# Patient Record
Sex: FEMALE | Race: WHITE | NOT HISPANIC OR LATINO | Employment: OTHER | URBAN - METROPOLITAN AREA
[De-identification: names, ages, dates, MRNs, and addresses within clinical notes are randomized per-mention and may not be internally consistent; named-entity substitution may affect disease eponyms.]

---

## 2020-05-19 ENCOUNTER — CONVERSION ENCOUNTER (OUTPATIENT)
Dept: OTHER | Facility: HOSPITAL | Age: 85
End: 2020-05-19

## 2020-05-19 ENCOUNTER — OFFICE VISIT CONVERTED (OUTPATIENT)
Dept: CARDIOLOGY | Facility: CLINIC | Age: 85
End: 2020-05-19
Attending: INTERNAL MEDICINE

## 2020-09-15 ENCOUNTER — OFFICE VISIT CONVERTED (OUTPATIENT)
Dept: CARDIOLOGY | Facility: CLINIC | Age: 85
End: 2020-09-15
Attending: INTERNAL MEDICINE

## 2020-09-15 ENCOUNTER — CONVERSION ENCOUNTER (OUTPATIENT)
Dept: OTHER | Facility: HOSPITAL | Age: 85
End: 2020-09-15

## 2020-10-29 ENCOUNTER — HOSPITAL ENCOUNTER (OUTPATIENT)
Dept: INFUSION THERAPY | Facility: HOSPITAL | Age: 85
Setting detail: HOSPITAL OUTPATIENT SURGERY
Discharge: HOME | End: 2020-10-29
Attending: INTERNAL MEDICINE

## 2020-10-29 LAB
ANION GAP SERPL CALC-SCNC: 16 MMOL/L (ref 8–19)
APTT BLD: 21.7 S (ref 22.2–34.2)
BASOPHILS # BLD AUTO: 0.05 10*3/UL (ref 0–0.2)
BASOPHILS NFR BLD AUTO: 0.9 % (ref 0–3)
BUN SERPL-MCNC: 33 MG/DL (ref 5–25)
BUN/CREAT SERPL: 26 {RATIO} (ref 6–20)
CALCIUM SERPL-MCNC: 10.6 MG/DL (ref 8.7–10.4)
CHLORIDE SERPL-SCNC: 102 MMOL/L (ref 99–111)
CONV ABS IMM GRAN: 0.02 10*3/UL (ref 0–0.2)
CONV CO2: 28 MMOL/L (ref 22–32)
CONV IMMATURE GRAN: 0.4 % (ref 0–1.8)
CREAT UR-MCNC: 1.26 MG/DL (ref 0.5–0.9)
DEPRECATED RDW RBC AUTO: 49.6 FL (ref 36.4–46.3)
EOSINOPHIL # BLD AUTO: 0.12 10*3/UL (ref 0–0.7)
EOSINOPHIL # BLD AUTO: 2.1 % (ref 0–7)
ERYTHROCYTE [DISTWIDTH] IN BLOOD BY AUTOMATED COUNT: 14 % (ref 11.7–14.4)
GFR SERPLBLD BASED ON 1.73 SQ M-ARVRAT: 39 ML/MIN/{1.73_M2}
GLUCOSE SERPL-MCNC: 109 MG/DL (ref 65–99)
HCT VFR BLD AUTO: 39.7 % (ref 37–47)
HGB BLD-MCNC: 12.7 G/DL (ref 12–16)
INR PPP: 0.97 (ref 2–3)
LYMPHOCYTES # BLD AUTO: 1.38 10*3/UL (ref 1–5)
LYMPHOCYTES NFR BLD AUTO: 24.2 % (ref 20–45)
MCH RBC QN AUTO: 30.8 PG (ref 27–31)
MCHC RBC AUTO-ENTMCNC: 32 G/DL (ref 33–37)
MCV RBC AUTO: 96.4 FL (ref 81–99)
MONOCYTES # BLD AUTO: 0.42 10*3/UL (ref 0.2–1.2)
MONOCYTES NFR BLD AUTO: 7.4 % (ref 3–10)
NEUTROPHILS # BLD AUTO: 3.72 10*3/UL (ref 2–8)
NEUTROPHILS NFR BLD AUTO: 65 % (ref 30–85)
NRBC CBCN: 0 % (ref 0–0.7)
OSMOLALITY SERPL CALC.SUM OF ELEC: 302 MOSM/KG (ref 273–304)
PLATELET # BLD AUTO: 143 10*3/UL (ref 130–400)
PMV BLD AUTO: 9.6 FL (ref 9.4–12.3)
POTASSIUM SERPL-SCNC: 4.1 MMOL/L (ref 3.5–5.3)
PROTHROMBIN TIME: 10.5 S (ref 9.4–12)
RBC # BLD AUTO: 4.12 10*6/UL (ref 4.2–5.4)
SODIUM SERPL-SCNC: 142 MMOL/L (ref 135–147)
WBC # BLD AUTO: 5.71 10*3/UL (ref 4.8–10.8)

## 2020-11-10 ENCOUNTER — CONVERSION ENCOUNTER (OUTPATIENT)
Dept: OTHER | Facility: HOSPITAL | Age: 85
End: 2020-11-10

## 2020-11-10 ENCOUNTER — OFFICE VISIT CONVERTED (OUTPATIENT)
Dept: CARDIOLOGY | Facility: CLINIC | Age: 85
End: 2020-11-10
Attending: INTERNAL MEDICINE

## 2021-03-16 ENCOUNTER — OFFICE VISIT CONVERTED (OUTPATIENT)
Dept: CARDIOLOGY | Facility: CLINIC | Age: 86
End: 2021-03-16
Attending: INTERNAL MEDICINE

## 2021-03-16 ENCOUNTER — CONVERSION ENCOUNTER (OUTPATIENT)
Dept: OTHER | Facility: HOSPITAL | Age: 86
End: 2021-03-16

## 2021-05-10 NOTE — H&P
History and Physical      Patient Name: Renetta Cuello   Patient ID: [id number]   Sex: Female   YOB: 1935    Primary Care Provider: Joanne Mcdermott   Referring Provider: Joanne Mcdermott    Visit Date: May 19, 2020    Provider: Abel Best MD   Location: [facility]   Location Address: [address] [place] [address]  [id number]          History Of Present Illness  Consult requested by: Joanne Mcdermott   Dear Joanne, I saw Renetta Cuello in the office today. As you know, she is an 85-year-old, white female. She has a history of coronary artery disease, which has been medically managed for several years, chronic atrial fibrillation and a dual-chamber permanent pacemaker. She has a history of chronic lymphedema and diastolic heart failure. Recently she has had some increased weakness and feelings of some subjective tachycardia. Her thyroid medication dose was reduced recently. Otherwise she denies chest pain. She is frail and has difficulty with mobility but denies excessive shortness of breath. She has chronic lower-extremity edema, which has been stable.   PAST MEDICAL HISTORY: includes atrial fibrillation, sinus node dysfunction, permanent pacemaker, diastolic heart failure, chronic lower-extremity edema, dyslipidemia, hypothyroidism, carotid artery disease. Previous cardiac catheterization has shown somewhat diffuse coronary artery disease with 100% chronic total occlusion of the LAD, dominant right coronary which is diffusely diseased, and this has been managed medically over time.   PSYCHOSOCIAL HISTORY: No smoking. No alcohol use. Daily caffeine. She is .   FAMILY HISTORY: Positive for coronary artery disease and hypertension.   CURRENT MEDICATIONS: include Lasix 40 mg twice a day; Eliquis 2.5 mg b.i.d.; Levothyroxine 125 mcg a day; Carvedilol 25 mg twice a day; Hydrocodone as needed; Ramipril 10 mg a day; Potassium 20 mEq twice a day; Pravastatin 40 mg a day;  "Gabapentin 300 mg a day; Vitamin D; Vitamin B12. The dosage and frequency of the medications were reviewed with the patient.   ALLERGIES: No medical allergies.      PAST SURGICAL HISTORY:  She has had a hysterectomy, tonsillectomy, hip replacement, pacemaker implant which is a Robodrom device.       Review of Systems  · Constitutional  o Admits  o : fatigue, good general health lately  o Denies  o : recent weight changes   · Eyes  o Admits  o : blurred vision  o Denies  o : double vision  · HENT  o Admits  o : hearing loss  o Denies  o : ringing, chronic sinus problem, swollen glands in neck  · Cardiovascular  o Admits  o : palpitations, lower-extremity swelling and shortness of breath.  o Denies  o : chest pain  · Respiratory  o Denies  o : chronic or frequent cough, asthma or wheezing, COPD  · Gastrointestinal  o Admits  o : nausea  o Denies  o : ulcers, vomiting  · Neurologic  o Admits  o : occasional dizziness  o Denies  o : lightheaded, stroke, headaches  · Musculoskeletal  o Denies  o : joint pain, back pain  · Endocrine  o Admits  o : thyroid disease, cold intolerance, excessive thirst.  o Denies  o : diabetes, heat intolerance, excessive urination  · Heme-Lymph  o Admits  o : easy bruising  o Denies  o : bleeding tendency, anemia      Vitals  Date Time BP Position Site L\R Cuff Size HR RR TEMP (F) WT  HT  BMI kg/m2 BSA m2 O2 Sat HC       05/19/2020 02:49 /93 Sitting    78 - R   157lbs 16oz 5'  6\" 25.5 1.83     05/19/2020 02:49 /79 Sitting                      Temperature:  98.6.       Physical Examination  · Constitutional  o Appearance  o : This is an elderly, white female, pleasant, in no acute distress.  · Head and Face  o HEENT  o : No pallor, anicteric. Eyes normal. Moist mucous membranes.  · Neck  o Inspection/Palpation  o : Supple. No hepatosplenomegaly.  o Jugular Veins  o : No JVD. No carotid bruits.  · Respiratory  o Auscultation of Lungs  o : Clear to auscultation bilaterally. " No crackles or wheezing.  · Cardiovascular  o Heart  o : Irregular S1 and S2 with a soft basal systolic murmur.  · Gastrointestinal  o Abdominal Examination  o : Soft, non-distended. No palpable hepatosplenomegaly. Bowel sounds heard in all four quadrants.  · Musculoskeletal  o General  o : Normal muscle tone and strength.  · Skin and Subcutaneous Tissue  o General Inspection  o : No skin rashes.  · Extremities  o Extremities  o : Warm and well perfused. Distal pulses present. 2+ chronic edema.     I personally reviewed her EKG done today for atrial fibrillation that shows atrial fibrillation with demand ventricular pacing, rate 88, left ventricular hypertrophy with non-specific ST changes.    I reviewed her previous cardiac records.  I reviewed her primary care records as well.    Most recent creatinine 1.09.               Assessment     1.  History of sinus node dysfunction and AV block with dual-chamber permanent pacemaker - The patient has       not had a recent pacemaker interrogation over the past year for unclear reasons.  She apparently is still on       remote monitoring.  Her device on EKG appears to be functioning normally.    2.  Chronic atrial fibrillation - rate stable currently.  She is perhaps feeling more palpitations recently due to her       thyroid function, and this is being addressed.  Continue dose-adjusted anti-coagulation for stroke        prevention.  3.  Diastolic heart failure - stable.  4.  Hypertension - stable.       Plan     We will get her remote monitoring transferred to our practice, and we will schedule an in-office pacemaker   interrogation in the next couple of months.  She is overdue for this.  The patient is agreeable with this plan.    It is a privilege to assist in her care.  Please let me know if you have any questions regarding her case.    Sincerely,        HERB Best M.D.  raymond/milagros           This note was transcribed by Lona Johnson.  [email or web address]/cbd  The above service was  transcribed by Lona Johnson, and I attest to the accuracy of the note.  CBD.             Electronically Signed by: Lona Johnson-, -Author on May 27, 2020 06:20:33 AM  Electronically Co-signed by: Abel Best MD -Reviewer on May 27, 2020 08:41:57 AM

## 2021-05-13 NOTE — PROGRESS NOTES
Progress Note      Patient Name: Renetta Bailey   Patient ID: [id number]   Sex: Female   YOB: 1935    Primary Care Provider: Joanne Mcdermott   Referring Provider: Joanne Mcdermott    Visit Date: September 15, 2020    Provider: Abel Best MD   [facility]   Location Address: [address] [place] [address]  [phone]          History Of Present Illness  REFERRING CARE PROVIDER: Joanne Mcdermott   Dear PO Rubio saw Renetta Bailey in the office today. This is for a follow-up evaluation and management of chronic atrial fibrillation, AV block with permanent pacemaker, diastolic heart failure, chronic lower-extremity edema, coronary artery disease with 100%  of the LAD and hypertension. Renetta is doing relatively well. She has chronic shortness of breath with exertion, chronic lower-extremity edema; these have been stable. She denies bleeding on chronic anti-coagulation. No palpitations or chest pain.   PAST MEDICAL HISTORY: includes atrial fibrillation, sinus node dysfunction, permanent pacemaker, diastolic heart failure, chronic lower-extremity edema, dyslipidemia, hypothyroidism, carotid artery disease. Previous cardiac catheterization has shown somewhat diffuse coronary artery disease with 100% chronic total occlusion of the LAD, dominant right coronary which is diffusely diseased, and this has been managed medically over time.   PSYCHOSOCIAL HISTORY: No history of mood changes or depression. She never used alcohol or tobacco.   CURRENT MEDICATIONS: include Lasix 40 mg twice a day; Eliquis 2.5 mg twice a day; Levothyroxine 125 mcg daily; Coreg 25 mg twice a day; Hydrocodone as needed; Ramipril 10 mg daily; Potassium 20 mEq daily; Pravastatin 40 mg daily; Gabapentin 300 mg daily; Vitamin D; Vitamin B. The dosage and frequency of the medications were reviewed with the patient.       Review of Systems  · Cardiovascular  o Admits  o : swelling  "(feet, ankles, hands), shortness of breath while walking or lying flat  o Denies  o : palpitations (fast, fluttering, or skipping beats), chest pain or angina pectoris   · Respiratory  o Denies  o : chronic or frequent cough, asthma or wheezing      Vitals  Date Time BP Position Site L\R Cuff Size HR RR TEMP (F) WT  HT  BMI kg/m2 BSA m2 O2 Sat        09/15/2020 10:20 /76 Sitting    73 - R   160lbs 0oz 5'  6\" 25.82 1.84           Physical Examination  · Respiratory  o Auscultation of Lungs  o : Clear to auscultation bilaterally. No crackles or rhonchi.  · Cardiovascular  o Heart  o : S1, S2 is normally heard. No S3. No rubs or gallops. She has a soft basal systolic murmur.   · Gastrointestinal  o Abdominal Examination  o : Soft, nontender, nondistended. No free fluid. Bowel sounds heard in all four quadrants.  · Extremities  o Extremities  o : Warm and well perfused. 2+ chronic lower-extremity edema. Distal pulses present.     Her pacemaker was interrogated today.  I reviewed the intracardiac tracings.  The ventricular lead function is normal.  She is programmed VVIR.  She is 59% ventricular paced, 100% atrial fibrillation, one non-sustained ventricular episode.  Battery life, 1 to 2 months before RICO.           Assessment     1.  History of sinus node dysfunction/AV block with dual-chamber permanent pacemaker - The device is       functioning normally.  It is very close to elective replacement voltage.  2.  Chronic atrial fibrillation - Rate controlled, on chronic anti-coagulation.  3.  Diastolic heart failure - Stable.  4.  Coronary artery disease with  of the LAD - Clinically stable.  5.  Hypertension - Stable.       Plan     1.  Continue current medical therapy.  2.  Remote pacemaker monitoring, will discuss scheduling and generator replacement once she reaches        elective replacement voltage.  3.  Clinical follow-up will be arranged.    It is a pleasure to assist in her care.   Please let me know " if you have any questions regarding her case.    Sincerely,         HERB andrade/milagros           This note was transcribed by Lona Johnson.  dmd/cbd  The above service was transcribed by Lona Johnson, and I attest to the accuracy of the note.  CBD.             Electronically Signed by: Lona Johnson-, -Author on September 17, 2020 09:31:17 AM  Electronically Co-signed by: Abel Best MD -Reviewer on September 17, 2020 11:33:08 AM

## 2021-05-13 NOTE — PROGRESS NOTES
Progress Note      Patient Name: Renetta Bailey   Patient ID: [id number]   Sex: Female   YOB: 1935    Primary Care Provider: Joanne Mcdermott   Referring Provider: Joanne Mcdermott    Visit Date: November 10, 2020    Provider: Abel Best MD   [facility]   Location Address: [address] [place] [address]  [phone]          History Of Present Illness  REFERRING CARE PROVIDER: Joanne Mcdermott   I saw Renetta Bailey back in the office today for a follow-up of recent pacemaker generator replacement, chronic atrial fibrillation, AV block, diastolic heart failure. Renetta is doing relatively well. She underwent pacemaker generator replacement October 29th. The site is healing well. She denies fevers, chills or excessive pain at the site.   PAST MEDICAL HISTORY: includes atrial fibrillation, sinus node dysfunction, permanent pacemaker, diastolic heart failure, chronic lower-extremity edema, dyslipidemia, hypothyroidism, carotid artery disease. Previous cardiac catheterization has shown somewhat diffuse coronary artery disease with 100% chronic total occlusion of the LAD, dominant right coronary which is diffusely diseased, and this has been managed medically over time.   PSYCHOSOCIAL HISTORY: She never used alcohol.   CURRENT MEDICATIONS: include Lasix 40 mg b.i.d.; Eliquis 2.5 mg b.i.d; Levothyroxine 125 mcg daily; Coreg 25 mg b.i.d.; Hydrocodone p.r.n.; Ramipril 10 mg daily; Potassium 20 mEq daily; Pravastatin 40 mg daily; Gabapentin 300 mg daily; Vitamin D; Vitamin B. The dosage and frequency of the medications were reviewed with the patient.       Review of Systems  · Cardiovascular  o Admits  o : swelling (feet, ankles, hands), shortness of breath while walking or lying flat  o Denies  o : palpitations (fast, fluttering, or skipping beats), chest pain or angina pectoris   · Respiratory  o Denies  o : chronic or frequent cough, asthma or  "wheezing      Vitals  Date Time BP Position Site L\R Cuff Size HR RR TEMP (F) WT  HT  BMI kg/m2 BSA m2 O2 Sat FR L/min FiO2 HC       11/10/2020 10:51 /43 Sitting    77 - R   164lbs 0oz 5'  6\" 26.47 1.86             Physical Examination  · Respiratory  o Auscultation of Lungs  o : Clear to auscultation bilaterally. No crackles or rhonchi.  · Cardiovascular  o Heart  o : S1, S2 is normally heard. No S3. No murmur, rubs, or gallops. Her pacemaker site is clean, dry and intact. Skin edge is healing well. No hematoma or fluctuance.  · Gastrointestinal  o Abdominal Examination  o : Soft, nontender, nondistended. No free fluid. Bowel sounds heard in all four quadrants.  · Extremities  o Extremities  o : Warm and well perfused. 2+ lower-extremity edema. Distal pulses present.          Assessment     1.  AV block with dual-chamber permanent pacemaker, s/p generator replacement on October 29th.  Her site      is healing well with no complications.  2.  Chronic atrial fibrillation.  3.  Diastolic heart failure - Stable.       Plan     Follow up in 3 months with pacemaker interrogation and office visit.  Continue remote monitoring in the meantime.  Continue current medical therapy.    HERB andrade/milagros           This note was transcribed by Lona Johnson.  [email or web address]/cbd  The above service was transcribed by Lona Johnson, and I attest to the accuracy of the note.  CBD.             Electronically Signed by: Lona Johnson-, -Author on November 13, 2020 04:17:43 AM  Electronically Co-signed by: Abel Best MD -Reviewer on November 13, 2020 09:05:38 AM  "

## 2021-05-14 VITALS
HEART RATE: 73 BPM | SYSTOLIC BLOOD PRESSURE: 124 MMHG | BODY MASS INDEX: 25.71 KG/M2 | WEIGHT: 160 LBS | HEIGHT: 66 IN | DIASTOLIC BLOOD PRESSURE: 76 MMHG

## 2021-05-14 VITALS
SYSTOLIC BLOOD PRESSURE: 130 MMHG | HEART RATE: 77 BPM | DIASTOLIC BLOOD PRESSURE: 43 MMHG | BODY MASS INDEX: 26.36 KG/M2 | HEIGHT: 66 IN | WEIGHT: 164 LBS

## 2021-05-14 VITALS
HEART RATE: 71 BPM | BODY MASS INDEX: 26.68 KG/M2 | DIASTOLIC BLOOD PRESSURE: 58 MMHG | WEIGHT: 166 LBS | HEIGHT: 66 IN | SYSTOLIC BLOOD PRESSURE: 130 MMHG

## 2021-05-14 NOTE — PROGRESS NOTES
Progress Note      Patient Name: Renetta Bailey   Patient ID: [id number]   Sex: Female   YOB: 1935    Primary Care Provider: Joanne Mcdermott   Referring Provider: Joanne Mcdermott    Visit Date: March 16, 2021    Provider: Abel Best MD   Location: [facility]   Location Address: [address] [place] [address]  [phone]          History Of Present Illness  REFERRING CARE PROVIDER: Joanne Mcdermott   Renetta Bailey is an 86 year old /White female who presents for followup evaluation and management of chronic atrial fibrillation, AV block, diastolic heart failure, and permanent pacemaker. Since the last visit, Renetta is doing relatively well. She has chronic dyspnea and chronic lower extremity edema, which is stable. She is compliant with her medications. No bleeding problems on anticoagulation.   PAST MEDICAL HISTORY: Atrial fibrillation; Sinus node dysfunction; Permanent pacemaker; Diastolic heart failure; Chronic lower extremity edema; Dyslipidemia, Hypothyroidism; Carotid artery disease. Previous cardiac catheterization has shown somewhat diffuse coronary artery disease with 100% chronic total occlusion of the LAD, dominant right coronary, which is diffusely diseased, and this has been managed medically over time.   PSYCHOSOCIAL HISTORY: Denies alcohol or tobacco use.   CURRENT MEDICATIONS: Medication list reviewed and as documented.      ALLERGIES: No known drug allergies.       Review of Systems  · Cardiovascular  o Admits  o : palpitations (fast, fluttering, or skipping beats), swelling (feet, ankles, hands), shortness of breath while walking or lying flat  o Denies  o : chest pain or angina pectoris   · Respiratory  o Denies  o : chronic or frequent cough      Vitals  Date Time BP Position Site L\R Cuff Size HR RR TEMP (F) WT  HT  BMI kg/m2 BSA m2 O2 Sat FR L/min FiO2 HC       03/16/2021 11:27 /58 Sitting    71 - R   165lbs 16oz  "5'  6\" 26.79 1.87             Physical Examination  · Constitutional  o Appearance  o : Overweight, elderly, White female, pleasant, in no acute distress.   · Respiratory  o Auscultation of Lungs  o : Clear to auscultation bilaterally. No crackles or rhonchi.  · Cardiovascular  o Heart  o : Irregular S1 and S2 with a soft basal systolic murmur.  · Gastrointestinal  o Abdominal Examination  o : Soft, nontender, nondistended. No free fluid. Bowel sounds heard in all four quadrants.  · Extremities  o Extremities  o : 2+ chronic lower extremity edema.  · Labs  o Labs  o : Laboratory studies reviewed.   · Device Interrogation  o Device Interrogation  o : Her pacemaker was interrogated today. Battery life is excellent. She is 64% ventricular paced at VVI 70. Lead systems are functioning normally.  · Data  o Data  o : Primary Care records were reviewed.           Assessment     1.  High-grade AV block with dual-chamber permanent pacemaker.  Her device is functioning normally.  2.  Chronic/permanent atrial fibrillation, rate controlled.  3.  Hypertension, stable.  4.  Diastolic heart failure, stable.       Plan     1.  I wrote for the patient's gabapentin for 30 days, because she was out of that.  Otherwise, she will continue        her current medical regimen.    2.  We will continue remote pacemaker monitoring, and a followup will be arranged in 6 months.  She is        agreeable with this plan.                     Electronically Signed by: Karine Loja-, Other -Author on March 25, 2021 06:47:21 PM  Electronically Co-signed by: Abel Best MD -Reviewer on March 26, 2021 07:16:48 AM  "

## 2021-05-15 VITALS
DIASTOLIC BLOOD PRESSURE: 93 MMHG | WEIGHT: 158 LBS | SYSTOLIC BLOOD PRESSURE: 147 MMHG | HEART RATE: 78 BPM | HEIGHT: 66 IN | BODY MASS INDEX: 25.39 KG/M2

## 2021-06-16 ENCOUNTER — TELEPHONE (OUTPATIENT)
Dept: CARDIOLOGY | Facility: CLINIC | Age: 86
End: 2021-06-16

## 2021-06-16 NOTE — TELEPHONE ENCOUNTER
Patient called wanting eliquis changed because of the cost. Advised patient that I would send her an application for patient assistance and she voiced understanding

## 2021-06-28 ENCOUNTER — TELEPHONE (OUTPATIENT)
Dept: CARDIOLOGY | Facility: CLINIC | Age: 86
End: 2021-06-28

## 2021-07-02 NOTE — TELEPHONE ENCOUNTER
Start warfarin 5 mg daily  Stop Eliquis the same day you start warfarin  Recommend INR be checked 4 days after starting warfarin, then based on INR after that

## 2021-07-07 NOTE — TELEPHONE ENCOUNTER
Deepthi with Joanne Jay's office aware of medication changes. Patient is to come into office on Monday to get labs drawn and they will call in the prescription of warfarin. Patient aware of these changes and advised to stop the Eliquis and start the warfarin and go to their office to lab her labs drawn. She voiced understanding.

## 2021-07-09 NOTE — TELEPHONE ENCOUNTER
pcp called with concerns about patient switching to warfarin. PCP was concerned that patient would not come into the office to get lab work. Advised that I spoke with patient and she is aware that she will have to come into the office weekly for a while until she gets to therapeutic levels and that she would get a ride. She question what dose of warfarin she needs to start on and when to check her INR. Advised that I spoke with Deepthi at her office and Deepthi in the background voiced that it was taken care of already. Advised to call us if patient has an issue coming into the office and have labs done and that I would discuss the issue with Dr. Best but that patient voiced that she was aware that labs were required for this medication change to warfarin and she would get them done. PCP voiced understanding.

## 2021-07-27 ENCOUNTER — OFFICE VISIT (OUTPATIENT)
Dept: CARDIOLOGY | Facility: CLINIC | Age: 86
End: 2021-07-27

## 2021-07-27 VITALS
SYSTOLIC BLOOD PRESSURE: 130 MMHG | WEIGHT: 165 LBS | BODY MASS INDEX: 26.52 KG/M2 | DIASTOLIC BLOOD PRESSURE: 64 MMHG | HEART RATE: 73 BPM | HEIGHT: 66 IN

## 2021-07-27 DIAGNOSIS — I48.21 PERMANENT ATRIAL FIBRILLATION (HCC): Primary | ICD-10-CM

## 2021-07-27 DIAGNOSIS — I44.2 AV BLOCK, COMPLETE (HCC): ICD-10-CM

## 2021-07-27 DIAGNOSIS — I25.118 CORONARY ARTERY DISEASE OF NATIVE ARTERY OF NATIVE HEART WITH STABLE ANGINA PECTORIS (HCC): ICD-10-CM

## 2021-07-27 DIAGNOSIS — I50.32 CHRONIC DIASTOLIC CONGESTIVE HEART FAILURE (HCC): ICD-10-CM

## 2021-07-27 DIAGNOSIS — I10 ESSENTIAL HYPERTENSION: ICD-10-CM

## 2021-07-27 PROCEDURE — 99213 OFFICE O/P EST LOW 20 MIN: CPT | Performed by: INTERNAL MEDICINE

## 2021-07-27 RX ORDER — LEVOTHYROXINE SODIUM 0.12 MG/1
125 TABLET ORAL DAILY
COMMUNITY

## 2021-07-27 RX ORDER — HYDROCODONE BITARTRATE AND ACETAMINOPHEN 5; 325 MG/1; MG/1
1 TABLET ORAL EVERY 6 HOURS PRN
COMMUNITY

## 2021-07-27 RX ORDER — MELATONIN
1000 DAILY
COMMUNITY

## 2021-07-27 RX ORDER — PRAVASTATIN SODIUM 40 MG
TABLET ORAL DAILY
Status: ON HOLD | COMMUNITY
Start: 2021-06-16 | End: 2022-02-04

## 2021-07-27 RX ORDER — ACETAMINOPHEN 500 MG
500 TABLET ORAL EVERY 6 HOURS PRN
COMMUNITY

## 2021-07-27 RX ORDER — GABAPENTIN 300 MG/1
300 CAPSULE ORAL DAILY
Status: ON HOLD | COMMUNITY
Start: 2021-06-24 | End: 2022-02-04

## 2021-07-27 RX ORDER — CARVEDILOL 25 MG/1
25 TABLET ORAL 2 TIMES DAILY WITH MEALS
COMMUNITY
Start: 2016-12-14 | End: 2022-08-16 | Stop reason: SDUPTHER

## 2021-07-27 RX ORDER — RAMIPRIL 10 MG/1
CAPSULE ORAL
Status: ON HOLD | COMMUNITY
Start: 2021-05-04 | End: 2022-02-04

## 2021-07-27 RX ORDER — FUROSEMIDE 40 MG/1
40 TABLET ORAL 2 TIMES DAILY
COMMUNITY
Start: 2021-07-04

## 2021-07-27 RX ORDER — POTASSIUM CHLORIDE 20 MEQ/1
20 TABLET, EXTENDED RELEASE ORAL DAILY
COMMUNITY
Start: 2021-07-01

## 2021-07-27 NOTE — PROGRESS NOTES
Chief Complaint  Atrial Fibrillation, Congestive Heart Failure, and AV BLOCK    Subjective            Renetta Bailey presents to [facility] CARDIOLOGY  History of Present Illness    Ms. Drew is here for follow-up evaluation and management of chronic atrial fibrillation, high-grade AV block, dual-chamber pacemaker, hypertension, chronic diastolic heart failure.  She has been relatively stable, has good days and bad days, recently has been a bit more fatigued and sometimes feels a harder heartbeat when she first lays down at night.  Remote monitoring of the pacemaker shows 74% ventricular pacing, chronic atrial fibrillation with no significant arrhythmias.    PMH  Past Medical History:   Diagnosis Date   • Atrial fibrillation (CMS/HCC)    • CHF (congestive heart failure) (CMS/[facility])    • Coronary artery disease    • Hyperlipidemia    • Hypertension          SURGICALHX  Past Surgical History:   Procedure Laterality Date   • HIP SURGERY     • HYSTERECTOMY     • INSERT / REPLACE / REMOVE PACEMAKER     • TONSILLECTOMY          SOC  Social History     Socioeconomic History   • Marital status:      Spouse name: Not on file   • Number of children: Not on file   • Years of education: Not on file   • Highest education level: Not on file   Tobacco Use   • Smoking status: Never Smoker   • Smokeless tobacco: Never Used   Vaping Use   • Vaping Use: Never used   Substance and Sexual Activity   • Alcohol use: Never   • Drug use: Never   • Sexual activity: Defer         FAMHX  Family History   Problem Relation Age of Onset   • Heart attack Mother    • No Known Problems Father           ALLERGY  No Known Allergies     MEDSCURRENT    Current Outpatient Medications:   •  acetaminophen (TYLENOL) 500 MG tablet, Take 500 mg by mouth Every 6 (Six) Hours As Needed for Mild Pain ., Disp: , Rfl:   •  apixaban (Eliquis) 2.5 MG tablet tablet, 2 (two) times a day., Disp: , Rfl:   •  carvedilol (COREG) 25 MG tablet,  "Take 25 mg by mouth Daily., Disp: , Rfl:   •  cholecalciferol (Cholecalciferol) 25 MCG (1000 UT) tablet, Take 1,000 Units by mouth Daily., Disp: , Rfl:   •  cyanocobalamin ([employer] Vitamin B-12) 1000 MCG tablet, Take  by mouth Daily., Disp: , Rfl:   •  furosemide (LASIX) 40 MG tablet, 2 (two) times a day., Disp: , Rfl:   •  gabapentin (NEURONTIN) 300 MG capsule, Take 300 mg by mouth Daily., Disp: , Rfl:   •  HYDROcodone-acetaminophen (NORCO) 5-325 MG per tablet, Take 1 tablet by mouth Every 6 (Six) Hours As Needed., Disp: , Rfl:   •  levothyroxine (SYNTHROID, LEVOTHROID) 125 MCG tablet, Take 125 mcg by mouth Daily., Disp: , Rfl:   •  potassium chloride (K-DUR,KLOR-CON) 20 MEQ CR tablet, Daily., Disp: , Rfl:   •  pravastatin (PRAVACHOL) 40 MG tablet, Daily., Disp: , Rfl:   •  ramipril (ALTACE) 10 MG capsule, , Disp: , Rfl:       Review of Systems   Constitutional: Positive for malaise/fatigue.   Cardiovascular: Positive for dyspnea on exertion and leg swelling.   Respiratory: Positive for shortness of breath.         Objective     /64   Pulse 73   Ht 167.6 cm (66\")   Wt 74.8 kg (165 lb)   BMI 26.63 kg/m²       General Appearance:   · well developed  · well nourished  HENT:   · oropharynx moist  · lips not cyanotic  Neck:  · thyroid not enlarged  · supple  Respiratory:  · no respiratory distress  · normal breath sounds  · no rales  Cardiovascular:  · no jugular venous distention  · regular rhythm  · apical impulse normal  · S1 normal, S2 normal  · no S3, no S4   · Soft basal systolic murmur  · no rub, no thrill  · carotid pulses normal; no bruit  · pedal pulses normal  · lower extremity edema: 2+  Musculoskeletal:  · no clubbing of fingers.   · normocephalic, head atraumatic  Skin:   · warm, dry  Psychiatric:  · judgement and insight appropriate  · normal mood and affect      Result Review :             Data reviewed: Pacemaker remote interrogation reviewed as stated above, recent labs from PCP show stable " findings, primary care records reviewed.     Procedures             Assessment and Plan        ASSESSMENT:  Encounter Diagnoses   Name Primary?   • Permanent atrial fibrillation (CMS/HCC) Yes   • Chronic diastolic congestive heart failure (CMS/HCC)    • Coronary artery disease of native artery of native heart with stable angina pectoris (CMS/HCC)    • Essential hypertension    • AV block, complete (CMS/[facility])          PLAN:    1.  Ms. Drew is clinically stable, she has chronic atrial fibrillation, no evidence of any new arrhythmias on pacemaker monitoring.  Pacemaker function is normal.  Continue anticoagulation for stroke prevention  2.  Diastolic heart failure stable, continue current medical regimen  3.  Blood pressure controlled, continue current medical regimen      Follow-up as scheduled in September        Patient was given instructions and counseling regarding her condition or for health maintenance advice. Please see specific information pulled into the AVS if appropriate.             CRISTHIAN Best MD  7/27/2021    12:19 EDT

## 2021-09-21 ENCOUNTER — CLINICAL SUPPORT NO REQUIREMENTS (OUTPATIENT)
Dept: CARDIOLOGY | Facility: CLINIC | Age: 86
End: 2021-09-21

## 2021-09-21 ENCOUNTER — OFFICE VISIT (OUTPATIENT)
Dept: CARDIOLOGY | Facility: CLINIC | Age: 86
End: 2021-09-21

## 2021-09-21 VITALS
SYSTOLIC BLOOD PRESSURE: 120 MMHG | BODY MASS INDEX: 26.16 KG/M2 | WEIGHT: 157 LBS | DIASTOLIC BLOOD PRESSURE: 69 MMHG | HEART RATE: 73 BPM | HEIGHT: 65 IN

## 2021-09-21 DIAGNOSIS — I44.2 AV BLOCK, COMPLETE (HCC): ICD-10-CM

## 2021-09-21 DIAGNOSIS — I50.32 CHRONIC DIASTOLIC CONGESTIVE HEART FAILURE (HCC): ICD-10-CM

## 2021-09-21 DIAGNOSIS — I25.118 CORONARY ARTERY DISEASE OF NATIVE ARTERY OF NATIVE HEART WITH STABLE ANGINA PECTORIS (HCC): ICD-10-CM

## 2021-09-21 DIAGNOSIS — Z95.0 PRESENCE OF CARDIAC PACEMAKER: ICD-10-CM

## 2021-09-21 DIAGNOSIS — I10 ESSENTIAL HYPERTENSION: ICD-10-CM

## 2021-09-21 DIAGNOSIS — I48.21 PERMANENT ATRIAL FIBRILLATION (HCC): Primary | ICD-10-CM

## 2021-09-21 PROCEDURE — 99214 OFFICE O/P EST MOD 30 MIN: CPT | Performed by: INTERNAL MEDICINE

## 2021-09-21 PROCEDURE — 93280 PM DEVICE PROGR EVAL DUAL: CPT | Performed by: INTERNAL MEDICINE

## 2021-09-21 NOTE — PROGRESS NOTES
Chief Complaint  Edema and pacemaker check today    Subjective     {Problem List  Visit Diagnosis   Encounters  Notes  Medications  Labs  Result Review Imaging  Media :23}       Renetta Bailey presents to [facility] CARDIOLOGY  History of Present Illness    Cameron is here for follow-up evaluation management of chronic atrial fibrillation, chronic diastolic heart failure, AV block with pacemaker coronary artery disease, hypertension, permanent pacemaker.  Cameron is doing relatively well.  Her main complaint is the cost of Eliquis.  She has chronic lower extremity edema, dyspnea on exertion but stable.    PMH  Past Medical History:   Diagnosis Date   • Atrial fibrillation (CMS/[facility])    • CHF (congestive heart failure) (CMS/[facility])    • Coronary artery disease    • Hyperlipidemia    • Hypertension          SURGICALHX  Past Surgical History:   Procedure Laterality Date   • HIP SURGERY     • HYSTERECTOMY     • INSERT / REPLACE / REMOVE PACEMAKER     • TONSILLECTOMY          SOC  Social History     Socioeconomic History   • Marital status:      Spouse name: Not on file   • Number of children: Not on file   • Years of education: Not on file   • Highest education level: Not on file   Tobacco Use   • Smoking status: Never Smoker   • Smokeless tobacco: Never Used   Vaping Use   • Vaping Use: Never used   Substance and Sexual Activity   • Alcohol use: Never   • Drug use: Never   • Sexual activity: Defer         FAMHX  Family History   Problem Relation Age of Onset   • Heart attack Mother    • No Known Problems Father           ALLERGY  Allergies   Allergen Reactions   • Morphine Nausea Only and Other (See Comments)     Pt states it makes her feel weak and sick to her stomach           MEDSCURRENT    Current Outpatient Medications:   •  acetaminophen (TYLENOL) 500 MG tablet, Take 500 mg by mouth Every 6 (Six) Hours As Needed for Mild Pain ., Disp: , Rfl:   •  carvedilol (COREG) 25 MG tablet,  "Take 25 mg by mouth Daily., Disp: , Rfl:   •  cholecalciferol (Cholecalciferol) 25 MCG (1000 UT) tablet, Take 1,000 Units by mouth Daily., Disp: , Rfl:   •  cyanocobalamin ([employer] Vitamin B-12) 1000 MCG tablet, Take  by mouth Daily., Disp: , Rfl:   •  furosemide (LASIX) 40 MG tablet, 2 (two) times a day., Disp: , Rfl:   •  gabapentin (NEURONTIN) 300 MG capsule, Take 300 mg by mouth Daily., Disp: , Rfl:   •  HYDROcodone-acetaminophen (NORCO) 5-325 MG per tablet, Take 1 tablet by mouth Every 6 (Six) Hours As Needed., Disp: , Rfl:   •  levothyroxine (SYNTHROID, LEVOTHROID) 125 MCG tablet, Take 125 mcg by mouth Daily., Disp: , Rfl:   •  potassium chloride (K-DUR,KLOR-CON) 20 MEQ CR tablet, Daily., Disp: , Rfl:   •  pravastatin (PRAVACHOL) 40 MG tablet, Daily., Disp: , Rfl:   •  ramipril (ALTACE) 10 MG capsule, , Disp: , Rfl:   •  rivaroxaban (Xarelto) 20 MG tablet, Take 1 tablet by mouth Daily., Disp: 90 tablet, Rfl: 3      Review of Systems   Cardiovascular: Positive for dyspnea on exertion and leg swelling.   Respiratory: Positive for shortness of breath.         Objective     /69   Pulse 73   Ht 165.1 cm (65\")   Wt 71.2 kg (157 lb)   BMI 26.13 kg/m²       General Appearance:   · well developed  · well nourished  HENT:   · oropharynx moist  · lips not cyanotic  Neck:  · thyroid not enlarged  · supple  Respiratory:  · no respiratory distress  · normal breath sounds  · no rales  Cardiovascular:  · no jugular venous distention  · regular rhythm  · apical impulse normal  · S1 normal, S2 normal  · no S3, no S4   · no murmur  · no rub, no thrill  · carotid pulses normal; no bruit  · pedal pulses normal  · lower extremity edema: 1+  Musculoskeletal:  · no clubbing of fingers.   · normocephalic, head atraumatic  Skin:   · warm, dry  Psychiatric:  · judgement and insight appropriate  · normal mood and affect      Result Review :             Data reviewed: Primary care records reviewed, creatinine normal, pacemaker " interrogation today shows normal device function, 74% ventricular pacing     Procedures            Assessment and Plan        ASSESSMENT:  Encounter Diagnoses   Name Primary?   • Permanent atrial fibrillation (CMS/HCC) Yes   • Chronic diastolic congestive heart failure (CMS/HCC)    • Coronary artery disease of native artery of native heart with stable angina pectoris (CMS/[facility])    • Essential hypertension    • AV block, complete (CMS/[facility])          PLAN:    1.  Renetta is clinically stable, I am changing her to Xarelto for anticoagulation due to cost.  2.  Blood pressure stable, continue current management, coronary disease stable, diastolic heart failure stable  3.  Follow-up in 6 months          Patient was given instructions and counseling regarding her condition or for health maintenance advice. Please see specific information pulled into the AVS if appropriate.             CRISTHIAN Best MD  9/21/2021    09:54 EDT

## 2021-09-22 NOTE — PROGRESS NOTES
Normal DDIR Chamber Pacemaker device interrogation.  Normal evaluation of device function and lead measurements.  No optimization was needed of parameters or maximization of device longevity.  Patient is on automated Home Remote Monitoring.  Paroxysmal Atrial Fibrillation, she is on Eliquis and Coreg.

## 2021-09-29 ENCOUNTER — TELEPHONE (OUTPATIENT)
Dept: CARDIOLOGY | Facility: CLINIC | Age: 86
End: 2021-09-29

## 2021-09-29 NOTE — TELEPHONE ENCOUNTER
Patient stated that she cannot afford the Xarelto and that she would prefer to go on Warfarin. I tried to explain that we could do a PA for the medication, but she stated that she doesn't think it will be approved. Please advise.

## 2021-09-29 NOTE — TELEPHONE ENCOUNTER
Please try to get PA or apply for patient assistance thru the company  Warfarin would be last resort    Abel Best MD

## 2021-12-07 ENCOUNTER — OUTSIDE FACILITY SERVICE (OUTPATIENT)
Dept: CARDIOLOGY | Facility: CLINIC | Age: 86
End: 2021-12-07

## 2021-12-07 PROCEDURE — 99204 OFFICE O/P NEW MOD 45 MIN: CPT | Performed by: NURSE PRACTITIONER

## 2021-12-27 RX ORDER — APIXABAN 2.5 MG/1
TABLET, FILM COATED ORAL
Qty: 180 TABLET | OUTPATIENT
Start: 2021-12-27

## 2022-01-03 RX ORDER — APIXABAN 2.5 MG/1
TABLET, FILM COATED ORAL
Qty: 180 TABLET | OUTPATIENT
Start: 2022-01-03

## 2022-02-04 ENCOUNTER — HOSPITAL ENCOUNTER (INPATIENT)
Facility: HOSPITAL | Age: 87
LOS: 4 days | Discharge: HOME HEALTH | End: 2022-02-08
Attending: HOSPITALIST | Admitting: HOSPITALIST

## 2022-02-04 DIAGNOSIS — I21.4 NSTEMI (NON-ST ELEVATED MYOCARDIAL INFARCTION): ICD-10-CM

## 2022-02-04 DIAGNOSIS — R26.2 DIFFICULTY WALKING: Primary | ICD-10-CM

## 2022-02-04 PROBLEM — I25.110 CORONARY ARTERY DISEASE INVOLVING NATIVE CORONARY ARTERY OF NATIVE HEART WITH UNSTABLE ANGINA PECTORIS (HCC): Status: ACTIVE | Noted: 2022-02-04

## 2022-02-04 PROBLEM — I50.9 CONGESTIVE HEART FAILURE (CHF): Status: ACTIVE | Noted: 2022-02-04

## 2022-02-04 PROBLEM — E78.5 HYPERLIPIDEMIA: Status: ACTIVE | Noted: 2022-02-04

## 2022-02-04 PROBLEM — I48.91 ATRIAL FIBRILLATION: Status: ACTIVE | Noted: 2022-02-04

## 2022-02-04 PROBLEM — I10 PRIMARY HYPERTENSION: Status: ACTIVE | Noted: 2022-02-04

## 2022-02-04 LAB
ALBUMIN SERPL-MCNC: 3.5 G/DL (ref 3.5–5.2)
ALBUMIN/GLOB SERPL: 1.5 G/DL
ALP SERPL-CCNC: 94 U/L (ref 39–117)
ALT SERPL W P-5'-P-CCNC: 7 U/L (ref 1–33)
ANION GAP SERPL CALCULATED.3IONS-SCNC: 11.6 MMOL/L (ref 5–15)
AST SERPL-CCNC: 12 U/L (ref 1–32)
BASOPHILS # BLD AUTO: 0.02 10*3/MM3 (ref 0–0.2)
BASOPHILS NFR BLD AUTO: 0.3 % (ref 0–1.5)
BILIRUB SERPL-MCNC: 0.8 MG/DL (ref 0–1.2)
BUN SERPL-MCNC: 11 MG/DL (ref 8–23)
BUN/CREAT SERPL: 12.4 (ref 7–25)
CALCIUM SPEC-SCNC: 9.4 MG/DL (ref 8.6–10.5)
CHLORIDE SERPL-SCNC: 105 MMOL/L (ref 98–107)
CO2 SERPL-SCNC: 23.4 MMOL/L (ref 22–29)
CREAT SERPL-MCNC: 0.89 MG/DL (ref 0.57–1)
DEPRECATED RDW RBC AUTO: 65.5 FL (ref 37–54)
EOSINOPHIL # BLD AUTO: 0.09 10*3/MM3 (ref 0–0.4)
EOSINOPHIL NFR BLD AUTO: 1.5 % (ref 0.3–6.2)
ERYTHROCYTE [DISTWIDTH] IN BLOOD BY AUTOMATED COUNT: 18.6 % (ref 12.3–15.4)
GFR SERPL CREATININE-BSD FRML MDRD: 60 ML/MIN/1.73
GLOBULIN UR ELPH-MCNC: 2.3 GM/DL
GLUCOSE SERPL-MCNC: 95 MG/DL (ref 65–99)
HCT VFR BLD AUTO: 30.7 % (ref 34–46.6)
HGB BLD-MCNC: 9.8 G/DL (ref 12–15.9)
IMM GRANULOCYTES # BLD AUTO: 0.09 10*3/MM3 (ref 0–0.05)
IMM GRANULOCYTES NFR BLD AUTO: 1.5 % (ref 0–0.5)
LYMPHOCYTES # BLD AUTO: 0.52 10*3/MM3 (ref 0.7–3.1)
LYMPHOCYTES NFR BLD AUTO: 8.8 % (ref 19.6–45.3)
MCH RBC QN AUTO: 31.3 PG (ref 26.6–33)
MCHC RBC AUTO-ENTMCNC: 31.9 G/DL (ref 31.5–35.7)
MCV RBC AUTO: 98.1 FL (ref 79–97)
MONOCYTES # BLD AUTO: 0.34 10*3/MM3 (ref 0.1–0.9)
MONOCYTES NFR BLD AUTO: 5.8 % (ref 5–12)
NEUTROPHILS NFR BLD AUTO: 4.83 10*3/MM3 (ref 1.7–7)
NEUTROPHILS NFR BLD AUTO: 82.1 % (ref 42.7–76)
NRBC BLD AUTO-RTO: 0.3 /100 WBC (ref 0–0.2)
PLATELET # BLD AUTO: 176 10*3/MM3 (ref 140–450)
PMV BLD AUTO: 9.6 FL (ref 6–12)
POTASSIUM SERPL-SCNC: 3.3 MMOL/L (ref 3.5–5.2)
PROT SERPL-MCNC: 5.8 G/DL (ref 6–8.5)
RBC # BLD AUTO: 3.13 10*6/MM3 (ref 3.77–5.28)
SODIUM SERPL-SCNC: 140 MMOL/L (ref 136–145)
TROPONIN T SERPL-MCNC: 0.18 NG/ML (ref 0–0.03)
WBC NRBC COR # BLD: 5.89 10*3/MM3 (ref 3.4–10.8)

## 2022-02-04 PROCEDURE — 85025 COMPLETE CBC W/AUTO DIFF WBC: CPT | Performed by: HOSPITALIST

## 2022-02-04 PROCEDURE — 80053 COMPREHEN METABOLIC PANEL: CPT | Performed by: HOSPITALIST

## 2022-02-04 PROCEDURE — 84484 ASSAY OF TROPONIN QUANT: CPT | Performed by: HOSPITALIST

## 2022-02-04 PROCEDURE — 93010 ELECTROCARDIOGRAM REPORT: CPT | Performed by: SPECIALIST

## 2022-02-04 PROCEDURE — 93005 ELECTROCARDIOGRAM TRACING: CPT | Performed by: HOSPITALIST

## 2022-02-04 PROCEDURE — 99223 1ST HOSP IP/OBS HIGH 75: CPT | Performed by: HOSPITALIST

## 2022-02-04 PROCEDURE — 94799 UNLISTED PULMONARY SVC/PX: CPT

## 2022-02-04 RX ORDER — FUROSEMIDE 40 MG/1
40 TABLET ORAL
Status: DISCONTINUED | OUTPATIENT
Start: 2022-02-04 | End: 2022-02-08 | Stop reason: HOSPADM

## 2022-02-04 RX ORDER — HYDROCODONE BITARTRATE AND ACETAMINOPHEN 5; 325 MG/1; MG/1
1 TABLET ORAL EVERY 6 HOURS PRN
Status: DISCONTINUED | OUTPATIENT
Start: 2022-02-04 | End: 2022-02-08 | Stop reason: HOSPADM

## 2022-02-04 RX ORDER — CARVEDILOL 25 MG/1
25 TABLET ORAL DAILY
Status: DISCONTINUED | OUTPATIENT
Start: 2022-02-04 | End: 2022-02-08 | Stop reason: HOSPADM

## 2022-02-04 RX ORDER — PRAVASTATIN SODIUM 40 MG
40 TABLET ORAL NIGHTLY
Status: DISCONTINUED | OUTPATIENT
Start: 2022-02-04 | End: 2022-02-08 | Stop reason: HOSPADM

## 2022-02-04 RX ORDER — ACETAMINOPHEN 500 MG
500 TABLET ORAL EVERY 6 HOURS PRN
Status: DISCONTINUED | OUTPATIENT
Start: 2022-02-04 | End: 2022-02-08 | Stop reason: SDUPTHER

## 2022-02-04 RX ORDER — GABAPENTIN 300 MG/1
300 CAPSULE ORAL 2 TIMES DAILY
COMMUNITY
End: 2022-02-08 | Stop reason: HOSPADM

## 2022-02-04 RX ORDER — MELATONIN
1000 DAILY
Status: DISCONTINUED | OUTPATIENT
Start: 2022-02-04 | End: 2022-02-08 | Stop reason: HOSPADM

## 2022-02-04 RX ORDER — LISINOPRIL 2.5 MG/1
2.5 TABLET ORAL
Status: DISCONTINUED | OUTPATIENT
Start: 2022-02-04 | End: 2022-02-08 | Stop reason: HOSPADM

## 2022-02-04 RX ORDER — POTASSIUM CHLORIDE 1.5 G/1.77G
20 POWDER, FOR SOLUTION ORAL DAILY
Status: DISCONTINUED | OUTPATIENT
Start: 2022-02-04 | End: 2022-02-08 | Stop reason: HOSPADM

## 2022-02-04 RX ORDER — GABAPENTIN 300 MG/1
300 CAPSULE ORAL DAILY
Status: DISCONTINUED | OUTPATIENT
Start: 2022-02-04 | End: 2022-02-08 | Stop reason: HOSPADM

## 2022-02-04 RX ORDER — SODIUM CHLORIDE 0.9 % (FLUSH) 0.9 %
10 SYRINGE (ML) INJECTION EVERY 12 HOURS SCHEDULED
Status: DISCONTINUED | OUTPATIENT
Start: 2022-02-04 | End: 2022-02-08 | Stop reason: HOSPADM

## 2022-02-04 RX ORDER — SODIUM CHLORIDE 0.9 % (FLUSH) 0.9 %
10 SYRINGE (ML) INJECTION AS NEEDED
Status: DISCONTINUED | OUTPATIENT
Start: 2022-02-04 | End: 2022-02-08 | Stop reason: HOSPADM

## 2022-02-04 RX ORDER — LEVOTHYROXINE SODIUM 0.12 MG/1
125 TABLET ORAL
Status: DISCONTINUED | OUTPATIENT
Start: 2022-02-04 | End: 2022-02-08 | Stop reason: HOSPADM

## 2022-02-04 RX ORDER — RAMIPRIL 5 MG/1
10 CAPSULE ORAL DAILY
COMMUNITY
Start: 2021-11-05

## 2022-02-04 RX ADMIN — LISINOPRIL 2.5 MG: 2.5 TABLET ORAL at 19:09

## 2022-02-04 RX ADMIN — RIVAROXABAN 20 MG: 20 TABLET, FILM COATED ORAL at 19:10

## 2022-02-04 RX ADMIN — SODIUM CHLORIDE, PRESERVATIVE FREE 10 ML: 5 INJECTION INTRAVENOUS at 21:58

## 2022-02-04 RX ADMIN — Medication 1000 UNITS: at 19:09

## 2022-02-04 RX ADMIN — PRAVASTATIN SODIUM 40 MG: 40 TABLET ORAL at 21:58

## 2022-02-04 RX ADMIN — CARVEDILOL 25 MG: 25 TABLET, FILM COATED ORAL at 19:10

## 2022-02-04 RX ADMIN — POTASSIUM CHLORIDE 20 MEQ: 1.5 POWDER, FOR SOLUTION ORAL at 19:10

## 2022-02-04 RX ADMIN — FUROSEMIDE 40 MG: 40 TABLET ORAL at 19:09

## 2022-02-04 RX ADMIN — LEVOTHYROXINE SODIUM 125 MCG: 125 TABLET ORAL at 19:10

## 2022-02-04 RX ADMIN — GABAPENTIN 300 MG: 300 CAPSULE ORAL at 19:09

## 2022-02-04 NOTE — H&P
[facility]   Patient Name: Renetta Bailey  : 1935  MRN: [id number]  Primary Care Physician:  Joanne Mcdermott APRN  Date of admission: 2022    Subjective   Subjective     Chief Complaint: Chest pain times several days    HPI:    Renetta Bailey is a 86 y.o. female with medical history significant for atrial fibrillation, congestive heart failure, CAD, hyperlipidemia, hypertension; presents with chest pain times several days.  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  Due to poor circulation, patient was supposed to have stents placed in her lower extremity.  Son reports that during procedure, patient started to complain of chest pain and procedure was stopped midway.  Patient was only able to have 1 stent placed out of the 3 that were needed.  Son reports that they were told that patient had a heart attack during the procedure.  Patient was then transferred to another facility and they discussed open heart surgery.  Patient was not agreeable to surgery.  Son reports that patient has been told in the past that she needed open heart surgery.  Son reports that patient underwent a cardiac cath many years ago.  Open heart surgery was recommended at that time, but they noted that patient did have collaterals and stated that patient would not be a good candidate for open heart.  Patient reports that she is not interested in having open heart surgery at the age of 86.  Since patient's amputation 1 week ago, patient has had decreased activity and appetite.  Son reports that patient has been instructed not to bear any weight on her right foot.  Patient notes having rhinorrhea secondary to the oxygen in her nose.  Patient also states that she has been going through intermittent constipation and diarrhea.  Patient reports having chronic back pain along with muscle aches and joint pains.   No fever, no chills, no nausea, no vomiting, no URI or UTI type symptoms, no cough, no shortness of breath, no wheezing, no dizziness, no headache, no confusion.      Personal History     Past Medical History:  Past Medical History:   Diagnosis Date   • Atrial fibrillation (CMS/[facility])    • CHF (congestive heart failure) (CMS/[facility])    • Coronary artery disease    • Hyperlipidemia    • Hypertension         Past Surgical History:  Past Surgical History:   Procedure Laterality Date   • HIP SURGERY     • HYSTERECTOMY     • INSERT / REPLACE / REMOVE PACEMAKER     • TONSILLECTOMY          Family History:   Family History   Problem Relation Age of Onset   • Heart attack Mother    • No Known Problems Father         Social History:   Social History     Socioeconomic History   • Marital status:    Tobacco Use   • Smoking status: Never Smoker   • Smokeless tobacco: Never Used   Vaping Use   • Vaping Use: Never used   Substance and Sexual Activity   • Alcohol use: Never   • Drug use: Never   • Sexual activity: Defer        Home Medications:  Prior to Admission medications    Medication Sig Start Date End Date Taking? Authorizing Provider   acetaminophen (TYLENOL) 500 MG tablet Take 500 mg by mouth Every 6 (Six) Hours As Needed for Mild Pain .    Sunita Bran MD   carvedilol (COREG) 25 MG tablet Take 25 mg by mouth Daily. 12/14/16 2/15/22  Sunita Bran MD   cholecalciferol (Cholecalciferol) 25 MCG (1000 UT) tablet Take 1,000 Units by mouth Daily.    Sunita Bran MD   cyanocobalamin ([employer] Vitamin B-12) 1000 MCG tablet Take  by mouth Daily.    Sunita Bran MD   furosemide (LASIX) 40 MG tablet 2 (two) times a day. 7/4/21   Sunita Bran MD   gabapentin (NEURONTIN) 300 MG capsule Take 300 mg by mouth Daily. 6/24/21   Sunita Bran MD   HYDROcodone-acetaminophen (NORCO) 5-325 MG per tablet Take 1 tablet by mouth Every 6 (Six) Hours As Needed.    Sunita Bran MD    levothyroxine (SYNTHROID, LEVOTHROID) 125 MCG tablet Take 125 mcg by mouth Daily.    Sunita Bran MD   potassium chloride (K-DUR,KLOR-CON) 20 MEQ CR tablet Daily. 7/1/21   Sunita Bran MD   pravastatin (PRAVACHOL) 40 MG tablet Daily. 6/16/21   Sunita Bran MD   ramipril (ALTACE) 2.5 MG capsule Take 2.5 mg by mouth Daily. 11/5/21   Sunita Bran MD   rivaroxaban (Xarelto) 20 MG tablet Take 1 tablet by mouth Daily. 9/21/21   CRISTHIAN Best MD   ramipril (ALTACE) 10 MG capsule  5/4/21 2/4/22  Sunita Bran MD        Allergies:  Allergies   Allergen Reactions   • Morphine Nausea Only and Other (See Comments)     Pt states it makes her feel weak and sick to her stomach          Review of Systems  Review of Systems   Constitutional: Positive for activity change and appetite change. Negative for chills, fatigue and fever.   HENT: Positive for rhinorrhea. Negative for congestion, ear pain, postnasal drip, sinus pressure, sinus pain, sneezing and sore throat.    Eyes: Negative for itching.   Respiratory: Negative for cough, shortness of breath and wheezing.    Cardiovascular: Positive for chest pain. Negative for leg swelling.   Gastrointestinal: Positive for constipation and diarrhea. Negative for abdominal pain, nausea and vomiting.   Endocrine: Negative for polydipsia.   Genitourinary: Negative for decreased urine volume, difficulty urinating, dysuria, flank pain, frequency, hematuria and urgency.   Musculoskeletal: Positive for arthralgias, back pain, gait problem and myalgias.   Skin: Positive for wound. Negative for pallor.   Neurological: Negative for dizziness and headaches.   Psychiatric/Behavioral: Negative for behavioral problems and confusion.           Objective   Objective     Vitals:   Temp:  [98.5 °F (36.9 °C)] 98.5 °F (36.9 °C)  Heart Rate:  [77] 77  Resp:  [18] 18  BP: (120)/(52) 120/52  Flow (L/min):  [6] 6    Physical Exam   Physical Exam  Constitutional:        General: She is not in acute distress.     Appearance: Normal appearance.   HENT:      Head: Normocephalic and atraumatic.      Right Ear: External ear normal.      Left Ear: External ear normal.      Nose: Nose normal.      Mouth/Throat:      Mouth: Mucous membranes are dry.   Eyes:      Extraocular Movements: Extraocular movements intact.      Pupils: Pupils are equal, round, and reactive to light.   Cardiovascular:      Rate and Rhythm: Normal rate and regular rhythm.      Pulses: Normal pulses.      Heart sounds: Normal heart sounds. No murmur heard.  No friction rub. No gallop.    Pulmonary:      Effort: Pulmonary effort is normal. No respiratory distress.      Breath sounds: Normal breath sounds. No wheezing, rhonchi or rales.   Abdominal:      General: Bowel sounds are normal. There is no distension.      Tenderness: There is no abdominal tenderness.   Musculoskeletal:         General: Normal range of motion.      Cervical back: Normal range of motion and neck supple.      Comments: Right leg with Ace bandage in place   Skin:     General: Skin is warm.      Capillary Refill: Capillary refill takes less than 2 seconds.      Findings: Lesion present.   Neurological:      General: No focal deficit present.      Mental Status: She is alert.          Result Review    Result Review:  No results found for: GLUCOSE, BUN, CREATININE, NA, K, CL, CO2, CALCIUM, PROTEINTOT, ALBUMIN, ALT, AST, ALKPHOS, BILITOT, EGFRIFNONA, LABIL2, BCR, ANIONGAP   No results found for: WBC, RBC, HGB, HCT, MCV, MCH, MCHC, RDW, RDWSD, MPV, PLT, NEUTRORELPCT, LYMPHORELPCT, MONORELPCT, EOSRELPCT, BASORELPCT, AUTOIGPER, NEUTROABS, LYMPHSABS, MONOSABS, EOSABS, BASOSABS, AUTOIGNUM, NRBC        Imaging:  No results found.       Assessment/Plan   Assessment / Plan     Active Hospital Problems    Diagnosis  POA   • NSTEMI (non-ST elevated myocardial infarction) (HCC) [I21.4]  Yes     Priority: High   • Coronary artery disease involving native  coronary artery of native heart with unstable angina pectoris (HCC) [I25.110]  Yes     Priority: Medium   • Atrial fibrillation (HCC) [I48.91]  Yes     Priority: Low   • Congestive heart failure (CHF) ([facility]) [I50.9]  Yes     Priority: Low   • Hyperlipidemia [E78.5]  Yes     Priority: Low   • Primary hypertension [I10]  Yes     Priority: Low        Assessment/Plan:   NSTEMI-patient transferred to our facility secondary to having an elevated troponin.  Son at the bedside states that patient has been complaining of on and off chest pain for the past week.  Son also notes that patient had a heart attack 1 week ago while having a vascular procedure done with stent placement to the lower extremity.  It is unknown, if patient elevated troponin is downtrending from last week or if this is a new elevation.  We will get serial troponin.  Cardiology has been consulted.  We will need to obtain records from outside facility.  CAD-patient with known coronary artery disease.  On review of medical records, it appears that patient had a cardiac cath done in 2007.  Patient was found to have 100% blockage of the LAD with collaterals.  Son states that patient was told that she would need open heart surgery, but she was found not to be a candidate at that time.  Son reports that no stents were placed at that time and it appears that patient was treated medically.  Last week, son reports that patient had a heart attack while getting stents placed in her lower extremity.  Vascular procedure had to be aborted secondary to patient complaining of chest pain.  Patient was found to be having an active heart attack and again it was felt that patient needed open heart surgery.  Patient not agreeable to open heart surgery secondary to her age.  Patient will be continued on her home cardiac medications.  Cardiology has been consulted.  Atrial fibrillation-patient with history of atrial fibrillation.  Patient will be continued on her home Xarelto  and Coreg.  Congestive heart failure-patient with history of congestive heart failure.  Patient does not appear to be in heart failure at this time.  Patient will be continued on Lasix.  Strict I's and O's.  Daily weights.  Hyperlipidemia-patient with history of hyperlipidemia.  Patient will be continued on a statin.  Primary hypertension-patient with history of hypertension.  Patient will be continued on her home blood pressure medications.      DVT prophylaxis:  Medical DVT prophylaxis orders are present.    CODE STATUS:    Level Of Support Discussed With: Patient  Code Status (Patient has no pulse and is not breathing): CPR (Attempt to Resuscitate)  Medical Interventions (Patient has pulse or is breathing): Full Support      Admission Status:  I believe this patient meets inpatient status.    Electronically signed by Janna Cr MD, 02/04/22, 5:00 PM EST.

## 2022-02-04 NOTE — PROGRESS NOTES
Patient has a past medical history of chronic atrial fibrillation (on DOAC), chronic diastolic heart failure, AV block with pacemaker, coronary artery disease, and hypertension. She follows with Dr. Best for outpatient cardiology.      Today, she is in the ER at [facility] with chest pain and elevated troponin (greater than 500 on their lab).  No significant ECG changes.  Additionally, she has signifcant anemia.     Dr. Best has agreed to consult.      Jesus Cole DO

## 2022-02-05 LAB
ANION GAP SERPL CALCULATED.3IONS-SCNC: 8.1 MMOL/L (ref 5–15)
ANISOCYTOSIS BLD QL: ABNORMAL
BACTERIA UR QL AUTO: ABNORMAL /HPF
BILIRUB UR QL STRIP: NEGATIVE
BUN SERPL-MCNC: 9 MG/DL (ref 8–23)
BUN/CREAT SERPL: 11.3 (ref 7–25)
CALCIUM SPEC-SCNC: 9.5 MG/DL (ref 8.6–10.5)
CHLORIDE SERPL-SCNC: 104 MMOL/L (ref 98–107)
CLARITY UR: CLEAR
CO2 SERPL-SCNC: 26.9 MMOL/L (ref 22–29)
COLOR UR: YELLOW
CREAT SERPL-MCNC: 0.8 MG/DL (ref 0.57–1)
DEPRECATED RDW RBC AUTO: 64 FL (ref 37–54)
EOSINOPHIL # BLD MANUAL: 0.05 10*3/MM3 (ref 0–0.4)
EOSINOPHIL NFR BLD MANUAL: 1 % (ref 0.3–6.2)
ERYTHROCYTE [DISTWIDTH] IN BLOOD BY AUTOMATED COUNT: 18.4 % (ref 12.3–15.4)
GFR SERPL CREATININE-BSD FRML MDRD: 68 ML/MIN/1.73
GLUCOSE SERPL-MCNC: 96 MG/DL (ref 65–99)
GLUCOSE UR STRIP-MCNC: NEGATIVE MG/DL
HCT VFR BLD AUTO: 27.6 % (ref 34–46.6)
HCT VFR BLD AUTO: 28.6 % (ref 34–46.6)
HGB BLD-MCNC: 9 G/DL (ref 12–15.9)
HGB BLD-MCNC: 9.2 G/DL (ref 12–15.9)
HGB UR QL STRIP.AUTO: ABNORMAL
HYALINE CASTS UR QL AUTO: ABNORMAL /LPF
KETONES UR QL STRIP: NEGATIVE
LEUKOCYTE ESTERASE UR QL STRIP.AUTO: ABNORMAL
LYMPHOCYTES # BLD MANUAL: 0.49 10*3/MM3 (ref 0.7–3.1)
LYMPHOCYTES NFR BLD MANUAL: 3 % (ref 5–12)
MAGNESIUM SERPL-MCNC: 1.7 MG/DL (ref 1.6–2.4)
MCH RBC QN AUTO: 31.1 PG (ref 26.6–33)
MCHC RBC AUTO-ENTMCNC: 32.6 G/DL (ref 31.5–35.7)
MCV RBC AUTO: 95.5 FL (ref 79–97)
MONOCYTES # BLD: 0.16 10*3/MM3 (ref 0.1–0.9)
NEUTROPHILS # BLD AUTO: 4.7 10*3/MM3 (ref 1.7–7)
NEUTROPHILS NFR BLD MANUAL: 87 % (ref 42.7–76)
NITRITE UR QL STRIP: NEGATIVE
PH UR STRIP.AUTO: 5.5 [PH] (ref 5–8)
PLAT MORPH BLD: NORMAL
PLATELET # BLD AUTO: 158 10*3/MM3 (ref 140–450)
PMV BLD AUTO: 9.5 FL (ref 6–12)
POTASSIUM SERPL-SCNC: 3.4 MMOL/L (ref 3.5–5.2)
PROT UR QL STRIP: NEGATIVE
QT INTERVAL: 405 MS
QT INTERVAL: 470 MS
RBC # BLD AUTO: 2.89 10*6/MM3 (ref 3.77–5.28)
RBC # UR STRIP: ABNORMAL /HPF
REF LAB TEST METHOD: ABNORMAL
SODIUM SERPL-SCNC: 139 MMOL/L (ref 136–145)
SP GR UR STRIP: 1.01 (ref 1–1.03)
SQUAMOUS #/AREA URNS HPF: ABNORMAL /HPF
TROPONIN T SERPL-MCNC: 0.16 NG/ML (ref 0–0.03)
TROPONIN T SERPL-MCNC: 0.18 NG/ML (ref 0–0.03)
UROBILINOGEN UR QL STRIP: ABNORMAL
VARIANT LYMPHS NFR BLD MANUAL: 9 % (ref 19.6–45.3)
WBC # UR STRIP: ABNORMAL /HPF
WBC MORPH BLD: NORMAL
WBC NRBC COR # BLD: 5.4 10*3/MM3 (ref 3.4–10.8)

## 2022-02-05 PROCEDURE — 81001 URINALYSIS AUTO W/SCOPE: CPT | Performed by: PHYSICIAN ASSISTANT

## 2022-02-05 PROCEDURE — 84484 ASSAY OF TROPONIN QUANT: CPT | Performed by: INTERNAL MEDICINE

## 2022-02-05 PROCEDURE — 94799 UNLISTED PULMONARY SVC/PX: CPT

## 2022-02-05 PROCEDURE — 99232 SBSQ HOSP IP/OBS MODERATE 35: CPT | Performed by: SPECIALIST

## 2022-02-05 PROCEDURE — 85007 BL SMEAR W/DIFF WBC COUNT: CPT | Performed by: HOSPITALIST

## 2022-02-05 PROCEDURE — 85027 COMPLETE CBC AUTOMATED: CPT | Performed by: HOSPITALIST

## 2022-02-05 PROCEDURE — 85014 HEMATOCRIT: CPT | Performed by: INTERNAL MEDICINE

## 2022-02-05 PROCEDURE — 99233 SBSQ HOSP IP/OBS HIGH 50: CPT | Performed by: INTERNAL MEDICINE

## 2022-02-05 PROCEDURE — 85018 HEMOGLOBIN: CPT | Performed by: INTERNAL MEDICINE

## 2022-02-05 PROCEDURE — 80048 BASIC METABOLIC PNL TOTAL CA: CPT | Performed by: HOSPITALIST

## 2022-02-05 PROCEDURE — 83735 ASSAY OF MAGNESIUM: CPT | Performed by: PHYSICIAN ASSISTANT

## 2022-02-05 PROCEDURE — 87086 URINE CULTURE/COLONY COUNT: CPT | Performed by: PHYSICIAN ASSISTANT

## 2022-02-05 RX ORDER — POTASSIUM CHLORIDE 750 MG/1
20 CAPSULE, EXTENDED RELEASE ORAL ONCE
Status: COMPLETED | OUTPATIENT
Start: 2022-02-05 | End: 2022-02-05

## 2022-02-05 RX ADMIN — LISINOPRIL 2.5 MG: 2.5 TABLET ORAL at 09:39

## 2022-02-05 RX ADMIN — PRAVASTATIN SODIUM 40 MG: 40 TABLET ORAL at 20:33

## 2022-02-05 RX ADMIN — FUROSEMIDE 40 MG: 40 TABLET ORAL at 17:04

## 2022-02-05 RX ADMIN — SODIUM CHLORIDE, PRESERVATIVE FREE 10 ML: 5 INJECTION INTRAVENOUS at 09:41

## 2022-02-05 RX ADMIN — POTASSIUM CHLORIDE 20 MEQ: 10 CAPSULE, COATED, EXTENDED RELEASE ORAL at 10:30

## 2022-02-05 RX ADMIN — SODIUM CHLORIDE, PRESERVATIVE FREE 10 ML: 5 INJECTION INTRAVENOUS at 20:32

## 2022-02-05 RX ADMIN — LEVOTHYROXINE SODIUM 125 MCG: 125 TABLET ORAL at 08:11

## 2022-02-05 RX ADMIN — POTASSIUM CHLORIDE 20 MEQ: 1.5 POWDER, FOR SOLUTION ORAL at 09:41

## 2022-02-05 RX ADMIN — Medication 1000 UNITS: at 09:40

## 2022-02-05 RX ADMIN — FUROSEMIDE 40 MG: 40 TABLET ORAL at 09:40

## 2022-02-05 RX ADMIN — MAGNESIUM OXIDE TAB 400 MG (241.3 MG ELEMENTAL MG) 400 MG: 400 (241.3 MG) TAB at 12:47

## 2022-02-05 RX ADMIN — HYDROCODONE BITARTRATE AND ACETAMINOPHEN 1 TABLET: 5; 325 TABLET ORAL at 20:33

## 2022-02-05 RX ADMIN — GABAPENTIN 300 MG: 300 CAPSULE ORAL at 09:40

## 2022-02-05 RX ADMIN — CARVEDILOL 25 MG: 25 TABLET, FILM COATED ORAL at 09:41

## 2022-02-05 NOTE — CASE MANAGEMENT/SOCIAL WORK
Discharge Planning Assessment   [place]     Patient Name: Renetta Bailey  MRN: [id number]  Today's Date: 2/5/2022    Admit Date: 2/4/2022     Discharge Needs Assessment     Row Name 02/05/22 1326       Living Environment    Lives With child(silvestre), adult    Name(s) of Who Lives With Patient Pt has two sons, Pt lives with son who has had a stroke in the past and tries to help him as much as possible.  Pt's other son has been staying with her since she has been sick.    Current Living Arrangements home/apartment/condo    Duration at Residence 6-7 years    Primary Care Provided by self; child(silvestre)    Provides Primary Care For child(silvestre)    Caregiving Concerns Pt lives with son who has had a stroke in the past.    Family Caregiver if Needed child(silvestre), adult    Family Caregiver Names Artie and Naveen    Quality of Family Relationships helpful; involved; supportive       Resource/Environmental Concerns    Resource/Environmental Concerns none       Transition Planning    Patient/Family Anticipates Transition to home with family    Patient/Family Anticipated Services at Transition home health care    Transportation Anticipated family or friend will provide       Discharge Needs Assessment    Equipment Currently Used at Home walker, rolling    Anticipated Changes Related to Illness inability to care for someone else    Discharge Coordination/Progress Pt has been vaccinated for Covid. Pt lives with her two sons at this time.  Pt is on Xarelto and need financila assistance.  Son at bedside stated she has been paying for this medication sometimes it is $500 sometimes it is $150.00.  SW and CM will follow up closer to discharge for medication needs.  Pt may be current with [facility].  SW awaiting confirmation form [employer].  SW will continue to follow for needs.               Discharge Plan     Row Name 02/05/22 9021       Plan    Plan Pt has been vaccinated for Covid. Pt lives with her two sons at this time.  Pt is on  Xarelto and need financila assistance.  Son at bedside stated she has been paying for this medication sometimes it is $500 sometimes it is $150.00.  SW and CM will follow up closer to discharge for medication needs.  Pt may be current with [facility].  SW awaiting confirmation form [employer].  SW will continue to follow for needs              Continued Care and Services - Admitted Since 2/4/2022    Coordination has not been started for this encounter.          Demographic Summary     Row Name 02/05/22 1257       General Information    Admission Type inpatient    Reason for Consult discharge planning    Preferred Language English       Contact Information    Permission Granted to Share Info With lay caregiver    Contact Information Obtained for lay caregiver    Contact Information Comments Artie Riverasantos       Lay Caregiver Information    Name, Lay Caregiver Artie Bailey    Phone, Lay Caregiver [phone]               Functional Status     Row Name 02/05/22 1325       Functional Status    Usual Activity Tolerance moderate    Current Activity Tolerance moderate       Functional Status, IADL    Medications independent; assistive person    Meal Preparation independent; assistive person    Housekeeping independent; assistive person    Laundry independent; assistive person    Shopping independent; assistive person       Mental Status Summary    Recent Changes in Mental Status/Cognitive Functioning no changes       Employment/    Employment Status retired               Psychosocial    No documentation.                Abuse/Neglect    No documentation.                Legal     Row Name 02/05/22 1326       Financial/Legal    Source of Income social security    Application for Public Assistance not applied       Legal    Criminal Activity/Legal Involvement none               Substance Abuse    No documentation.                Patient Forms    No documentation.                   Ragini Salcedo

## 2022-02-05 NOTE — PROGRESS NOTES
[facility]   Hospitalist Progress Note  Date: 2022  Patient Name: Renetta Bailey  : 1935  MRN: [id number]  Date of admission: 2022      Subjective   Subjective     Chief Complaint: Chest pain    Summary: Renetta Bailey is a 86 y.o. female with medical history significant for atrial fibrillation, congestive heart failure, CAD, hyperlipidemia, hypertension; [facility] several days patient evaluated there was noted to have elevated troponin patient's cardiologist Dr. Best was contacted and recommended transferring to our facility for further medical evaluation and treatment.  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  Due to poor circulation, patient was supposed to have stents placed in her lower extremity.  Son reports that during procedure, patient started to complain of chest pain and procedure was stopped midway.  Patient was only able to have 1 stent placed out of the 3 that were needed.  Son reports that they were told that patient had a heart attack during the procedure.  Patient was then transferred to another facility and they discussed open heart surgery.  Patient was not agreeable to surgery.  Son reports that patient has been told in the past that she needed open heart surgery.  Son reports that patient underwent a cardiac cath many years ago.  Open heart surgery was recommended at that time, but they noted that patient did have collaterals and stated that patient would not be a good candidate for open heart.  Patient reports that she is not interested in having open heart surgery at the age of 86.  Since patient's amputation 1 week ago, patient has had decreased activity and appetite.  Son reports that patient has been instructed not to bear any weight on her right foot.      Interval Followup:   Patient seen and examined awake alert patient's son is at the bedside currently  without chest pain shortness of air palpitations.  Per cardiology's note recommending holding Xarelto for now, in case patient will require procedure which would likely be done on Monday.  Patient with extensive right lower extremity wounds will have wound team evaluate.  Patient with large appearing right lower extremity hematoma at site of patient's chronic wound holding blood thinners.  Paced rhythm rate 70s to 80s    Review of systems:  All systems reviewed negative except the following:  Patient with right lower extremity pain from her wounds.  Denies chest pain shortness of air palpitations at this time.    Objective   Objective     Vitals:   Temp:  [98.1 °F (36.7 °C)-99.6 °F (37.6 °C)] 98.1 °F (36.7 °C)  Heart Rate:  [] 81  Resp:  [16-18] 16  BP: (102-148)/(42-81) 118/44  Flow (L/min):  [2-6] 2        Physical Exam    Constitutional: Awake, alert, no acute distress   Eyes: Pupils equal, sclerae anicteric, no conjunctival injection   HENT: NCAT, mucous membranes moist   Neck: Supple, no thyromegaly, no lymphadenopathy, trachea midline   Respiratory: Clear to auscultation bilaterally, nonlabored respirations    Cardiovascular: RRR, no murmurs, rubs, or gallops, palpable pedal pulses bilaterally   Gastrointestinal: Positive bowel sounds, soft, nontender, nondistended   Musculoskeletal: No bilateral ankle edema, no clubbing or cyanosis to extremities   Psychiatric: Appropriate affect, cooperative   Neurologic: Oriented x 3, strength symmetric in all extremities, Cranial Nerves grossly intact to confrontation, speech clear   Skin: Extensive wound on right foot from previous right toe amputation large right calf hematoma    Result Review    Result Review:  I have personally reviewed the results from the time of this admission to 2/5/2022 12:15 EST and agree with these findings:  [x]  Laboratory  []  Microbiology  [x]  Radiology  [x]  EKG/Telemetry   []  Cardiology/Vascular   []  Pathology  []  Old records  []   Other:    Assessment/Plan   Assessment / Plan   Assessment:    · Non-ST segment elevation MI  · Chest pain secondary to above  · Coronary artery disease with most recent cardiac catheterization done in 2007 showing 100% occlusion of the LAD  · Chronic atrial fibrillation on Xarelto  · History of pacemaker placement  · Combined systolic/diastolic congestive heart failure last known ejection fraction estimated 35 to 40% on echocardiogram 12/8/2021  · Peripheral arterial disease  · Hypertension  · Hyperlipidemia      Plan:    · Continue to trend cardiac enzymes  · Continue cardiac telemetry  · Continue beta-blocker ACE inhibitor Lasix and potassium supplementation  · Holding Xarelto can likely start heparin drip tomorrow  · Wound nurse consultation  · Cardiology consultation  · Further treatment is patient's contingent upon her hospital course  · Discussed with RN      DVT prophylaxis:  Medical DVT prophylaxis orders are present.    CODE STATUS:   Level Of Support Discussed With: Patient  Code Status (Patient has no pulse and is not breathing): CPR (Attempt to Resuscitate)  Medical Interventions (Patient has pulse or is breathing): Full Support        Electronically signed by SHARON Vigil, 02/05/22, 12:15 PM EST.      Attending Note:  I have seen and examined the patient and agree with the above information from Kane Beckford PA-C.  86-year-old female with A. fib, CHF, advanced coronary disease who presented from [facility] with chest pain, found to have elevated troponin.  Her cardiologist is here so she was transferred here.  Apparently, she had a toe amputation and a skin graft recently because she dropped some potatoes on her toe.  She had an MI while she was getting surgery, required transfer to [facility] in [place].  Reportedly she has severe heart disease that was not amenable to open heart surgery and she did not want surgery anyway.  Since her amputation 1 week prior she has had  decreased appetite and activity.  She presented to [facility] due to chest pain, was found to have an elevated troponin.  Apparently she was unable to get back to [facility] where she went for her heart attack and was transferred here to be under the care of Dr. Best, her cardiologist.  Spoke with cardiology this morning, recommended starting heparin at the appropriate time based on her last dose of Xarelto.  Patient has wounds to her right calf, to her foot.  Son says they are actually looking better with local wound care.  We will repeat troponin.  Check hemoglobin which should drop 0.8 g/dL since yesterday.  May need to be cautious with heparin given possible hematoma on her leg.  Ideally she would be at her original facility for continuity of care.  Potential cardiac cath on Monday per Dr. Boykin.    Electronically signed by Ralf Bellamy MD, 02/05/22, 3:29 PM EST.

## 2022-02-05 NOTE — CONSULTS
[facility]   Cardiology Consult Note    Patient Name: Renetta Bailey  : 1935  MRN: [id number]  Primary Care Physician:  Joanne Mcdermott APRN  Referring Physician: Jesus Cole DO  Date of admission: 2022    Subjective   Subjective     Reason for Consult/ Chief Complaint: Chest pain    HPI:  Renetta Bailey is a 86 y.o. female with history of permanent atrial fibrillation, permanent pacemaker implantation, CAD, recent toe surgery about a week ago.  Has had some chest pain on and off for several days.  She has since been since the procedure.  Chest pain is substernal, mild to aching, lasts for several minutes.  Went to the emergency room yesterday troponins are positive he was transferred here.  No chest pain at the present time no shortness of breath.    Review of Systems:   Constitutional no fever,  no weight loss   Skin no rash   Otolaryngeal no difficulty swallowing   Cardiovascular See HPI   Pulmonary no cough, no sputum production   Gastrointestinal no constipation, no diarrhea   Genitourinary no dysuria, no hematuria   Hematologic no easy bruisability, no abnormal bleeding   Musculoskeletal no muscle pain   Neurologic no dizziness, no falls         Personal History       Past Medical/Surgical History:   Past Medical History:   Diagnosis Date   • Atrial fibrillation (CMS/HCC)    • CHF (congestive heart failure) (CMS/HCC)    • Coronary artery disease    • Hyperlipidemia    • Hypertension      Past Surgical History:   Procedure Laterality Date   • HIP SURGERY     • HYSTERECTOMY     • INSERT / REPLACE / REMOVE PACEMAKER     • TONSILLECTOMY           Family History: No Family History of CAD.    Social History:  reports that she has never smoked. She has never used smokeless tobacco. She reports that she does not drink alcohol and does not use drugs.    Medications:  Medications Prior to Admission   Medication Sig Dispense Refill Last Dose   • acetaminophen (TYLENOL) 500 MG tablet  Take 500 mg by mouth Every 6 (Six) Hours As Needed for Mild Pain .   Unknown at Unknown time   • carvedilol (COREG) 25 MG tablet Take 25 mg by mouth 2 (Two) Times a Day With Meals.   Unknown at Unknown time   • cholecalciferol (Cholecalciferol) 25 MCG (1000 UT) tablet Take 1,000 Units by mouth Daily.   Unknown at Unknown time   • cyanocobalamin ([employer] Vitamin B-12) 1000 MCG tablet Take 100 mcg by mouth Daily.   Unknown at Unknown time   • furosemide (LASIX) 40 MG tablet Take 40 mg by mouth 2 (two) times a day.   Unknown at Unknown time   • gabapentin (NEURONTIN) 300 MG capsule Take 300 mg by mouth 2 (Two) Times a Day.   Unknown at Unknown time   • HYDROcodone-acetaminophen (NORCO) 5-325 MG per tablet Take 1 tablet by mouth Every 6 (Six) Hours As Needed.   Unknown at Unknown time   • levothyroxine (SYNTHROID, LEVOTHROID) 125 MCG tablet Take 125 mcg by mouth Daily.   Unknown at Unknown time   • potassium chloride (K-DUR,KLOR-CON) 20 MEQ CR tablet Take 20 mEq by mouth Daily.   Unknown at Unknown time   • ramipril (ALTACE) 5 MG capsule Take 10 mg by mouth Daily.   Unknown at Unknown time   • rivaroxaban (XARELTO) 15 MG tablet Take 15 mg by mouth Daily.   Unknown at Unknown time     Current medications:  carvedilol, 25 mg, Oral, Daily  cholecalciferol, 1,000 Units, Oral, Daily  furosemide, 40 mg, Oral, BID  gabapentin, 300 mg, Oral, Daily  levothyroxine, 125 mcg, Oral, QAM AC  lisinopril, 2.5 mg, Oral, Q24H  potassium chloride, 20 mEq, Oral, Daily  pravastatin, 40 mg, Oral, Nightly  rivaroxaban, 20 mg, Oral, Daily With Dinner  sodium chloride, 10 mL, Intravenous, Q12H      Current IV drips:       Allergies:  Allergies   Allergen Reactions   • Drug [Tramadol Hcl Er] Other (See Comments)     UNAROUSABLE   • Morphine Nausea Only and Other (See Comments)     Pt states it makes her feel weak and sick to her stomach          Objective    Objective     Vitals:   Temp:  [98.5 °F (36.9 °C)-99.6 °F (37.6 °C)] 98.8 °F (37.1  °C)  Heart Rate:  [] 89  Resp:  [16-18] 17  BP: (102-148)/(42-81) 119/48  Flow (L/min):  [4-6] 6      Physical Exam:   Constitutional: Awake, alert, No acute distress    Eyes: PERRLA, sclerae anicteric, no conjunctival injection   HENT: NCAT, mucous membranes moist   Neck: Supple, no thyromegaly, no lymphadenopathy, trachea midline   Respiratory: Clear to auscultation bilaterally, nonlabored respirations    Cardiovascular: Heart sounds irregular., no murmurs, rubs, or gallops   Gastrointestinal: Positive bowel sounds, soft, nontender, nondistended   Musculoskeletal: No bilateral ankle edema, right leg bandaged status post toe surgery.   Psychiatric: Appropriate affect, cooperative   Neurologic: Oriented x 3, strength symmetric in all extremities, Cranial Nerves grossly intact to confrontation, speech clear   Skin: No rashes.    Result Review    Result Review:  I have personally reviewed the results from the time of this admission to 2/5/2022 08:09 EST and agree with these findings:  [x]  Laboratory  [x]  EKG/Telemetry   [x]  Cardiology/Vascular   []  Pathology  [x]  Old records  [x]  Medications  Basic Metabolic Panel    Sodium Sodium   Date Value Ref Range Status   02/05/2022 139 136 - 145 mmol/L Final   02/04/2022 140 136 - 145 mmol/L Final      Potassium Potassium   Date Value Ref Range Status   02/05/2022 3.4 (L) 3.5 - 5.2 mmol/L Final   02/04/2022 3.3 (L) 3.5 - 5.2 mmol/L Final      Chloride Chloride   Date Value Ref Range Status   02/05/2022 104 98 - 107 mmol/L Final   02/04/2022 105 98 - 107 mmol/L Final      Bicarbonate No results found for: PLASMABICARB   BUN BUN   Date Value Ref Range Status   02/05/2022 9 8 - 23 mg/dL Final   02/04/2022 11 8 - 23 mg/dL Final      Creatinine Creatinine   Date Value Ref Range Status   02/05/2022 0.80 0.57 - 1.00 mg/dL Final   02/04/2022 0.89 0.57 - 1.00 mg/dL Final      Calcium Calcium   Date Value Ref Range Status   02/05/2022 9.5 8.6 - 10.5 mg/dL Final   02/04/2022  9.4 8.6 - 10.5 mg/dL Final              EKG shows sinus rhythm with no acute changes.  Telemetry reviewed    Assessment / Plan     Impression:   1.  Coronary artery disease non-ST myocardial infarction.  2.  Permanent atrial fibrillation with a controlled heart rate  3.  Peripheral vascular disease s/p amputation of the right toe.  4.  Essential hypertension.    Plan:   Hold Xarelto.  Probable cardiac cath on Monday.  Previous cardiac catheter showed 100% occluded LAD with collaterals.  Heparin as a bridge until procedure.  Continue current home meds.  Continue carvedilol.    Electronically signed by Ruslan Boykin MD, 02/05/22, 8:09 AM EST.

## 2022-02-05 NOTE — SIGNIFICANT NOTE
02/05/22 0930   Coping/Psychosocial   Observed Emotional State calm; cooperative   Verbalized Emotional State happiness; hopefulness   Trust Relationship/Rapport empathic listening provided   Family/Support Persons son   Involvement in Care interacting with patient   Additional Documentation Spiritual Care (Group)   Spiritual Care   Use of Spiritual Resources spirituality for coping, indicated strong use of; prayer   Spiritual Care Source nurse referral   Spiritual Care Follow-Up follow-up, none required as presently assessed   Response to Spiritual Care receptive of support; thanks expressed; engaged in conversation   Spiritual Care Interventions prayer support provided; supportive conversation provided   Spiritual Care Visit Type other (see comments)  (Consult)   Spiritual Care Request family support; prayer support   Receptivity to Spiritual Care visit welcomed

## 2022-02-05 NOTE — PLAN OF CARE
Problem: Skin Injury Risk Increased  Goal: Skin Health and Integrity  Outcome: Ongoing, Progressing   Goal Outcome Evaluation:  Plan of Care Reviewed With: patient        Progress: no change  Outcome Summary: Patient is bed bound, has not ambulated in a couple of weeks, generalized weakness and edema. Dressings on right leg changed. No issues, concerns, or complaints at this time. Will continue to monitor, call light in reach.

## 2022-02-06 LAB
ALBUMIN SERPL-MCNC: 3.1 G/DL (ref 3.5–5.2)
ANION GAP SERPL CALCULATED.3IONS-SCNC: 9.2 MMOL/L (ref 5–15)
APTT PPP: 27.7 SECONDS (ref 22.2–34.2)
APTT PPP: 38.9 SECONDS (ref 22.2–34.2)
BUN SERPL-MCNC: 13 MG/DL (ref 8–23)
BUN/CREAT SERPL: 16.5 (ref 7–25)
CALCIUM SPEC-SCNC: 9.5 MG/DL (ref 8.6–10.5)
CHLORIDE SERPL-SCNC: 101 MMOL/L (ref 98–107)
CHOLEST SERPL-MCNC: 122 MG/DL (ref 0–200)
CO2 SERPL-SCNC: 27.8 MMOL/L (ref 22–29)
CREAT SERPL-MCNC: 0.79 MG/DL (ref 0.57–1)
DEPRECATED RDW RBC AUTO: 61.5 FL (ref 37–54)
ERYTHROCYTE [DISTWIDTH] IN BLOOD BY AUTOMATED COUNT: 17.8 % (ref 12.3–15.4)
FOLATE SERPL-MCNC: 6.33 NG/ML (ref 4.78–24.2)
GFR SERPL CREATININE-BSD FRML MDRD: 69 ML/MIN/1.73
GLUCOSE BLDC GLUCOMTR-MCNC: 156 MG/DL (ref 70–99)
GLUCOSE SERPL-MCNC: 123 MG/DL (ref 65–99)
HBA1C MFR BLD: 5.1 % (ref 4.8–5.6)
HCT VFR BLD AUTO: 27.2 % (ref 34–46.6)
HCT VFR BLD AUTO: 28.2 % (ref 34–46.6)
HDLC SERPL-MCNC: 34 MG/DL (ref 40–60)
HGB BLD-MCNC: 9.1 G/DL (ref 12–15.9)
HGB BLD-MCNC: 9.2 G/DL (ref 12–15.9)
INR PPP: 1.05 (ref 2–3)
IRON 24H UR-MRATE: 28 MCG/DL (ref 37–145)
IRON SATN MFR SERPL: 13 % (ref 20–50)
LDLC SERPL CALC-MCNC: 68 MG/DL (ref 0–100)
LDLC/HDLC SERPL: 1.96 {RATIO}
MAGNESIUM SERPL-MCNC: 1.7 MG/DL (ref 1.6–2.4)
MCH RBC QN AUTO: 30.9 PG (ref 26.6–33)
MCHC RBC AUTO-ENTMCNC: 32.6 G/DL (ref 31.5–35.7)
MCV RBC AUTO: 94.6 FL (ref 79–97)
PHOSPHATE SERPL-MCNC: 2.4 MG/DL (ref 2.5–4.5)
PLATELET # BLD AUTO: 161 10*3/MM3 (ref 140–450)
PMV BLD AUTO: 9.5 FL (ref 6–12)
POTASSIUM SERPL-SCNC: 3.6 MMOL/L (ref 3.5–5.2)
PROTHROMBIN TIME: 10.9 SECONDS (ref 9.4–12)
RBC # BLD AUTO: 2.98 10*6/MM3 (ref 3.77–5.28)
SODIUM SERPL-SCNC: 138 MMOL/L (ref 136–145)
TIBC SERPL-MCNC: 218 MCG/DL (ref 298–536)
TRANSFERRIN SERPL-MCNC: 146 MG/DL (ref 200–360)
TRIGL SERPL-MCNC: 106 MG/DL (ref 0–150)
VIT B12 BLD-MCNC: 1284 PG/ML (ref 211–946)
VLDLC SERPL-MCNC: 20 MG/DL (ref 5–40)
WBC NRBC COR # BLD: 4.31 10*3/MM3 (ref 3.4–10.8)

## 2022-02-06 PROCEDURE — 99233 SBSQ HOSP IP/OBS HIGH 50: CPT | Performed by: INTERNAL MEDICINE

## 2022-02-06 PROCEDURE — 85730 THROMBOPLASTIN TIME PARTIAL: CPT | Performed by: PHYSICIAN ASSISTANT

## 2022-02-06 PROCEDURE — 85027 COMPLETE CBC AUTOMATED: CPT | Performed by: PHYSICIAN ASSISTANT

## 2022-02-06 PROCEDURE — 82746 ASSAY OF FOLIC ACID SERUM: CPT | Performed by: PHYSICIAN ASSISTANT

## 2022-02-06 PROCEDURE — 83540 ASSAY OF IRON: CPT | Performed by: PHYSICIAN ASSISTANT

## 2022-02-06 PROCEDURE — 84466 ASSAY OF TRANSFERRIN: CPT | Performed by: PHYSICIAN ASSISTANT

## 2022-02-06 PROCEDURE — 80069 RENAL FUNCTION PANEL: CPT | Performed by: PHYSICIAN ASSISTANT

## 2022-02-06 PROCEDURE — 25010000002 MAGNESIUM SULFATE IN D5W 1G/100ML (PREMIX) 1-5 GM/100ML-% SOLUTION: Performed by: PHYSICIAN ASSISTANT

## 2022-02-06 PROCEDURE — 82607 VITAMIN B-12: CPT | Performed by: PHYSICIAN ASSISTANT

## 2022-02-06 PROCEDURE — 80061 LIPID PANEL: CPT | Performed by: PHYSICIAN ASSISTANT

## 2022-02-06 PROCEDURE — 85014 HEMATOCRIT: CPT | Performed by: PHYSICIAN ASSISTANT

## 2022-02-06 PROCEDURE — 85610 PROTHROMBIN TIME: CPT | Performed by: PHYSICIAN ASSISTANT

## 2022-02-06 PROCEDURE — 25010000002 HEPARIN (PORCINE) PER 1000 UNITS: Performed by: PHYSICIAN ASSISTANT

## 2022-02-06 PROCEDURE — 85018 HEMOGLOBIN: CPT | Performed by: PHYSICIAN ASSISTANT

## 2022-02-06 PROCEDURE — 82962 GLUCOSE BLOOD TEST: CPT

## 2022-02-06 PROCEDURE — 83036 HEMOGLOBIN GLYCOSYLATED A1C: CPT | Performed by: PHYSICIAN ASSISTANT

## 2022-02-06 PROCEDURE — 99232 SBSQ HOSP IP/OBS MODERATE 35: CPT | Performed by: SPECIALIST

## 2022-02-06 PROCEDURE — 83735 ASSAY OF MAGNESIUM: CPT | Performed by: PHYSICIAN ASSISTANT

## 2022-02-06 RX ORDER — HEPARIN SOD,PORCINE/0.9 % NACL 25000/250
12 INTRAVENOUS SOLUTION INTRAVENOUS
Status: DISCONTINUED | OUTPATIENT
Start: 2022-02-06 | End: 2022-02-08 | Stop reason: HOSPADM

## 2022-02-06 RX ORDER — FENTANYL/ROPIVACAINE/NS/PF 2-625MCG/1
15 PLASTIC BAG, INJECTION (ML) EPIDURAL ONCE
Status: COMPLETED | OUTPATIENT
Start: 2022-02-06 | End: 2022-02-06

## 2022-02-06 RX ORDER — MAGNESIUM SULFATE 1 G/100ML
1 INJECTION INTRAVENOUS ONCE
Status: COMPLETED | OUTPATIENT
Start: 2022-02-06 | End: 2022-02-06

## 2022-02-06 RX ADMIN — HEPARIN SODIUM 12 UNITS/KG/HR: 5000 INJECTION INTRAVENOUS; SUBCUTANEOUS at 14:23

## 2022-02-06 RX ADMIN — FUROSEMIDE 40 MG: 40 TABLET ORAL at 17:26

## 2022-02-06 RX ADMIN — MAGNESIUM SULFATE 1 G: 1 INJECTION INTRAVENOUS at 08:33

## 2022-02-06 RX ADMIN — LEVOTHYROXINE SODIUM 125 MCG: 125 TABLET ORAL at 08:33

## 2022-02-06 RX ADMIN — PRAVASTATIN SODIUM 40 MG: 40 TABLET ORAL at 20:17

## 2022-02-06 RX ADMIN — SODIUM CHLORIDE, PRESERVATIVE FREE 10 ML: 5 INJECTION INTRAVENOUS at 08:34

## 2022-02-06 RX ADMIN — CARVEDILOL 25 MG: 25 TABLET, FILM COATED ORAL at 08:33

## 2022-02-06 RX ADMIN — FUROSEMIDE 40 MG: 40 TABLET ORAL at 08:33

## 2022-02-06 RX ADMIN — Medication 1000 UNITS: at 08:33

## 2022-02-06 RX ADMIN — LISINOPRIL 2.5 MG: 2.5 TABLET ORAL at 08:33

## 2022-02-06 RX ADMIN — GABAPENTIN 300 MG: 300 CAPSULE ORAL at 08:33

## 2022-02-06 RX ADMIN — POTASSIUM CHLORIDE 20 MEQ: 1.5 POWDER, FOR SOLUTION ORAL at 08:33

## 2022-02-06 RX ADMIN — POTASSIUM PHOSPHATE, MONOBASIC AND POTASSIUM PHOSPHATE, DIBASIC 15 MMOL: 224; 236 INJECTION, SOLUTION, CONCENTRATE INTRAVENOUS at 12:09

## 2022-02-06 RX ADMIN — HEPARIN SODIUM 12 UNITS/KG/HR: 5000 INJECTION INTRAVENOUS; SUBCUTANEOUS at 14:21

## 2022-02-06 NOTE — PROGRESS NOTES
[facility]   Hospitalist Progress Note  Date: 2022  Patient Name: Renetta Bailey  : 1935  MRN: [id number]  Date of admission: 2022      Subjective   Subjective     Chief Complaint: Chest pain    Summary: Renetta Bailey is a 86 y.o. female with medical history significant for atrial fibrillation, congestive heart failure, CAD, hyperlipidemia, hypertension; [facility] days patient evaluated there was noted to have elevated troponin patient's cardiologist Dr. Best was contacted and recommended transferring to our facility for further medical evaluation and treatment.  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  Due to poor circulation, patient was supposed to have stents placed in her lower extremity.  Son reports that during procedure, patient started to complain of chest pain and procedure was stopped midway.  Patient was only able to have 1 stent placed out of the 3 that were needed.  Son reports that they were told that patient had a heart attack during the procedure.  Patient was then transferred to another facility and they discussed open heart surgery.  Patient was not agreeable to surgery.  Son reports that patient has been told in the past that she needed open heart surgery.  Son reports that patient underwent a cardiac cath many years ago.  Open heart surgery was recommended at that time, but they noted that patient did have collaterals and stated that patient would not be a good candidate for open heart.  Patient reports that she is not interested in having open heart surgery at the age of 86.  Since patient's amputation 1 week ago, patient has had decreased activity and appetite.  Son reports that patient has been instructed not to bear any weight on her right foot.      Interval Followup:    Patient seen and examined today patient without chest pain no shortness of air no  palpitations.  Xarelto on hold for possible cardiac catheterization tomorrow we will start heparin drip today as bridge.  Continuing with wound care.  Patient with Shipley catheter in will discontinue after procedure tomorrow.    Review of systems:  All systems reviewed negative except the following:  Patient with right lower extremity pain from her wounds.  Denies chest pain shortness of air palpitations at this time.    Objective   Objective     Vitals:   Temp:  [97.3 °F (36.3 °C)-98.8 °F (37.1 °C)] 97.3 °F (36.3 °C)  Heart Rate:  [] 80  Resp:  [17-20] 18  BP: ()/(48-67) 120/48  Flow (L/min):  [2-4] 4        Physical Exam    Constitutional: Awake, alert, no acute distress   Eyes: Pupils equal, sclerae anicteric, no conjunctival injection   HENT: NCAT, mucous membranes moist   Neck: Supple, no thyromegaly, no lymphadenopathy, trachea midline   Respiratory: Clear to auscultation bilaterally, nonlabored respirations    Cardiovascular: RRR, no murmurs, rubs, or gallops, palpable pedal pulses bilaterally   Gastrointestinal: Positive bowel sounds, soft, nontender, nondistended   Musculoskeletal: No bilateral ankle edema, no clubbing or cyanosis to extremities   Psychiatric: Appropriate affect, cooperative   Neurologic: Oriented x 3, strength symmetric in all extremities, Cranial Nerves grossly intact to confrontation, speech clear   Skin: Extensive wound on right foot from previous right toe amputation large right calf hematoma    Result Review    Result Review:  I have personally reviewed the results from the time of this admission to 2/6/2022 10:45 EST and agree with these findings:  [x]  Laboratory  []  Microbiology  [x]  Radiology  [x]  EKG/Telemetry   []  Cardiology/Vascular   []  Pathology  []  Old records  []  Other:    Assessment/Plan   Assessment / Plan   Assessment:    · Non-ST segment elevation MI  · Chest pain secondary to above  · Coronary artery disease with most recent cardiac catheterization  done in 2007 showing 100% occlusion of the LAD  · Chronic atrial fibrillation on Xarelto  · History of pacemaker placement  · Combined systolic/diastolic congestive heart failure last known ejection fraction estimated 35 to 40% on echocardiogram 12/8/2021  · Peripheral arterial disease  · Hypertension  · Hyperlipidemia      Plan:    · Continue cardiac telemetry  · Continue beta-blocker ACE inhibitor Lasix and potassium supplementation  · Replacing phosphorus magnesium intravenously  · Holding Xarelto for possible cardiac catheterization tomorrow  · Starting heparin drip per ACS protocol minus the bolus  · Monitoring H&H  · Shipley catheter remains in place likely discontinue tomorrow after procedure.  · Continue with topical woundcare / wound nurse consultation  · Cardiology consultation appreciated continue care per the recommendations  · Further treatment is patient's contingent upon her hospital course  · Discussed with RN discussed with patient patient's son at the bedside  · Patient patient's son plan on going home after current work-up do not want rehab placement.      DVT prophylaxis:  Medical DVT prophylaxis orders are present.    CODE STATUS:   Level Of Support Discussed With: Patient  Code Status (Patient has no pulse and is not breathing): CPR (Attempt to Resuscitate)  Medical Interventions (Patient has pulse or is breathing): Full Support        Electronically signed by SHARON Vigil, 02/06/22, 10:59 AM EST.      Attending Note:  I have seen and examined the patient and agree with the above information from Kane Beckford PA-C.  86-year-old female with A. fib, CHF, advanced coronary disease who presented from [facility] with chest pain, found to have elevated troponin.  Her cardiologist is here so she was transferred here.  Apparently, she had a toe amputation and a skin graft recently because she dropped some potatoes on her toe.  She had an MI while she was getting surgery, required  transfer to [facility] in [place].  Reportedly she has severe heart disease that was not amenable to open heart surgery and she did not want surgery anyway.  Since her amputation 1 week prior she has had decreased appetite and activity.  She presented to [facility] due to chest pain, was found to have an elevated troponin.  Apparently she was unable to get back to [facility] where she went for her heart attack and was transferred here to be under the care of Dr. Best, her cardiologist.  She will be on a heparin drip prior to possible cardiac cath.  Wound care to legs.  Otherwise stable.  Electronically signed by Ralf Bellamy MD, 02/06/22, 7:41 PM EST.

## 2022-02-06 NOTE — PLAN OF CARE
Goal Outcome Evaluation:      Pt rested well with no complaints and son at bedside.  Pt did have pain and received pain medication where pt was able to rest.  VSS MS RN.

## 2022-02-06 NOTE — THERAPY EVALUATION
Acute Care - Physical Therapy Initial Evaluation   [place]     Patient Name: Renetta Bailey  : 1935  MRN: [id number]  Today's Date: 2022      Visit Dx:     ICD-10-CM ICD-9-CM   1. Difficulty walking  R26.2 719.7     Patient Active Problem List   Diagnosis   • NSTEMI (non-ST elevated myocardial infarction) ([facility])   • Atrial fibrillation ([facility])   • Congestive heart failure (CHF) ([facility])   • Coronary artery disease involving native coronary artery of native heart with unstable angina pectoris ([facility])   • Hyperlipidemia   • Primary hypertension     Past Medical History:   Diagnosis Date   • Atrial fibrillation (CMS/HCC)    • CHF (congestive heart failure) (CMS/[facility])    • Coronary artery disease    • Hyperlipidemia    • Hypertension      Past Surgical History:   Procedure Laterality Date   • HIP SURGERY     • HYSTERECTOMY     • INSERT / REPLACE / REMOVE PACEMAKER     • TONSILLECTOMY       PT Assessment (last 12 hours)     PT Evaluation and Treatment     Row Name 22          Physical Therapy Time and Intention    Subjective Information complains of; pain  -RP     Document Type evaluation  -RP     Mode of Treatment physical therapy  -RP     Patient Effort fair  -RP     Symptoms Noted During/After Treatment increased pain  -RP     Row Name 22          General Information    Prior Level of Function dependent:  past 2.5 weeks  -RP     Equipment Currently Used at Home walker, rolling; wheelchair  -RP     Existing Precautions/Restrictions fall; other (see comments)  , NWB R FOOT  -RP     Barriers to Rehab medically complex  -RP     Row Name 22          Previous Level of Function/Home Environm    Household Ambulation, Premorbid Functional Level unable to perform  -RP     Community Ambulation, Premorbid Functional Level unable to perform  -RP     Row Name 22          Living Environment    Current Living Arrangements home/apartment/condo  -RP     Lives With child(silvestre), adult   lives with son who previously had a stroke  -RP     Row Name 02/06/22 0900          Sensory Assessment (Somatosensory)    Sensory Assessment (Somatosensory) bilateral LE  -RP     Bilateral LE Sensory Assessment impaired  -RP     Row Name 02/06/22 0900          Pain Scale: Word Pre/Post-Treatment    Pain: Word Scale, Pretreatment 0 - no pain  -RP     Row Name 02/06/22 0900          Range of Motion (ROM)    Range of Motion other (see comments)  -RP     Row Name 02/06/22 0900          Range of Motion Comprehensive    Comment, General Range of Motion B ankle ROM 75% decrease  -RP     Row Name 02/06/22 0900          Strength (Manual Muscle Testing)    Strength (Manual Muscle Testing) other (see comments)  unable to assess due to BLE wounds  -RP     Row Name 02/06/22 0900          Bed Mobility    Bed Mobility bed mobility (all) activities  -     All Activities, [place] (Bed Mobility) dependent (less than 25% patient effort)  -     Bed Mobility, Safety Issues decreased use of arms for pushing/pulling; decreased use of legs for bridging/pushing; impaired trunk control for bed mobility  -RP     Row Name 02/06/22 0900          Safety Issues, Functional Mobility    Safety Issues Affecting Function (Mobility) friction/shear risk; unable to maintain weight-bearing restrictions  -RP     Row Name 02/06/22 0900          Balance    Balance Assessment sitting dynamic balance; sitting static balance  -RP     Static Sitting Balance severe impairment  -RP     Row Name             Wound 02/04/22 1716 sacral spine    Wound - Properties Group Placement Date: 02/04/22  -HW Placement Time: 1716  -HW Location: sacral spine  -HW     Retired Wound - Properties Group Date first assessed: 02/04/22  -HW Time first assessed: 1716  -HW Location: sacral spine  -HW     Row Name             Wound 02/04/22 1726 Left calf    Wound - Properties Group Placement Date: 02/04/22  -HW Placement Time: 1726  -HW Side: Left  -HW Location: calf  -HW      Retired Wound - Properties Group Date first assessed: 02/04/22  -HW Time first assessed: 1726  -HW Side: Left  -HW Location: calf  -HW     Row Name             Wound 02/04/22 1731    Wound - Properties Group Placement Date: 02/04/22  -HW Placement Time: 1731  -HW     Retired Wound - Properties Group Date first assessed: 02/04/22  -HW Time first assessed: 1731  -HW     Row Name             Wound 02/04/22 1733 Right calf    Wound - Properties Group Placement Date: 02/04/22  -HW Placement Time: 1733  -HW Side: Right  -HW Location: calf  -HW     Retired Wound - Properties Group Date first assessed: 02/04/22  -HW Time first assessed: 1733  -HW Side: Right  -HW Location: calf  -HW     Row Name             Wound 02/04/22 1737 Right lower leg    Wound - Properties Group Placement Date: 02/04/22  -HW Placement Time: 1737  -HW Side: Right  -HW Orientation: lower  -HW Location: leg  -HW     Retired Wound - Properties Group Date first assessed: 02/04/22  -HW Time first assessed: 1737  -HW Side: Right  -HW Location: leg  -HW     Row Name             Wound 02/04/22 1738 Right anterior    Wound - Properties Group Placement Date: 02/04/22  -HW Placement Time: 1738  -HW Side: Right  -HW Orientation: anterior  -HW     Retired Wound - Properties Group Date first assessed: 02/04/22  -HW Time first assessed: 1738  -HW Side: Right  -HW     Row Name             Wound Right anterior great toe Graft    Wound - Properties Group Present on Hospital Admission: Y  -TC Side: Right  -TC Orientation: anterior  -TC Location: great toe  -TC Primary Wound Type: Graft  -TC Additional Comments: Great toe amputated, area around toe and dorsal area of foot have skin graft.  -TC     Retired Wound - Properties Group Present on Hospital Admission: Y  -TC Side: Right  -TC Location: great toe  -TC Primary Wound Type: Graft  -TC Additional Comments: Great toe amputated, area around toe and dorsal area of foot have skin graft.  -TC     Row Name 02/06/22 0900           Plan of Care Review    Plan of Care Reviewed With patient  -RP     Progress no change  -RP     Outcome Summary Pt presents with significant BLE weakness, edema, wounds, and is dependent for bed mobility and transfers at this time. Pt has significant LE wounds and is at risk for skin tears and bed sores. Pt unable to sit edge of bed or stand due to LE pain and non weight bearing restrictions of RLE. Pt will benefit from skilled PT to improve safety and independence with bed mobility and transfers in order to transition to next place of care safely.  -RP     Row Name 02/06/22 0900          Therapy Assessment/Plan (PT)    PT Diagnosis (PT) weakness, difficulty walking  -RP     Rehab Potential (PT) fair, will monitor progress closely  -RP     Problem List (PT) balance; mobility; range of motion (ROM); sensation; strength; pain; postural control; inability to direct their own care  -RP     Activity Limitations Related to Problem List (PT) unable to ambulate safely; unable to transfer safely  -RP     Row Name 02/06/22 0900          PT Evaluation Complexity    History, PT Evaluation Complexity 3 or more personal factors and/or comorbidities  -RP     Examination of Body Systems (PT Eval Complexity) total of 3 or more elements  -RP     Clinical Presentation (PT Evaluation Complexity) evolving  -RP     Clinical Decision Making (PT Evaluation Complexity) moderate complexity  -RP     Overall Complexity (PT Evaluation Complexity) moderate complexity  -RP     Row Name 02/06/22 0900          Progress Summary (PT)    Progress Toward Functional Goals (PT) unable to show any progress toward functional goals  -RP     Row Name 02/06/22 0900          Therapy Plan Review/Discharge Plan (PT)    Therapy Plan Review (PT) evaluation/treatment results reviewed  -RP           User Key  (r) = Recorded By, (t) = Taken By, (c) = Cosigned By    Initials Name Provider Type    RP Staci Cole, PT Physical Therapist    Lucila Diaz, RN  Registered Nurse    oLis Hartley RN Registered Nurse                Physical Therapy Education                 Title: PT OT SLP Therapies (In Progress)     Topic: Physical Therapy (In Progress)     Point: Mobility training (Not Started)     Learner Progress:  Not documented in this visit.          Point: Home exercise program (Not Started)     Learner Progress:  Not documented in this visit.          Point: Body mechanics (Done)     Learning Progress Summary           Patient Acceptance, E, VU by RP at 2/6/2022 0934   Family Acceptance, E, VU by RP at 2/6/2022 0934                   Point: Precautions (Not Started)     Learner Progress:  Not documented in this visit.                      User Key     Initials Effective Dates Name Provider Type Discipline    RP 11/11/21 -  Staci Cole, PT Physical Therapist PT              PT Recommendation and Plan  Anticipated Discharge Disposition (PT): home with home health  Planned Therapy Interventions (PT): bed mobility training, gait training, home exercise program, patient/family education, postural re-education, ROM (range of motion), strengthening, wheelchair management/propulsion training  Therapy Frequency (PT): 3 times/wk  Progress Summary (PT)  Progress Toward Functional Goals (PT): unable to show any progress toward functional goals  Plan of Care Reviewed With: patient  Progress: no change  Outcome Summary: Pt presents with significant BLE weakness, edema, wounds, and is dependent for bed mobility and transfers at this time. Pt has significant LE wounds and is at risk for skin tears and bed sores. Pt unable to sit edge of bed or stand due to LE pain and non weight bearing restrictions of RLE. Pt will benefit from skilled PT to improve safety and independence with bed mobility and transfers in order to transition to next place of care safely.   Outcome Measures     Row Name 02/06/22 0900             How much help from another person do you currently need...     Turning from your back to your side while in flat bed without using bedrails? 1  -RP      Moving from lying on back to sitting on the side of a flat bed without bedrails? 1  -RP      Moving to and from a bed to a chair (including a wheelchair)? 1  -RP      Standing up from a chair using your arms (e.g., wheelchair, bedside chair)? 1  -RP      Climbing 3-5 steps with a railing? 1  -RP      To walk in hospital room? 1  -RP      AM-PAC 6 Clicks Score (PT) 6  -RP              Functional Assessment    Outcome Measure Options AM-PAC 6 Clicks Basic Mobility (PT)  -RP            User Key  (r) = Recorded By, (t) = Taken By, (c) = Cosigned By    Initials Name Provider Type    Staci Cook, ITALIA Physical Therapist                 Time Calculation:    PT Charges     Row Name 02/06/22 0937             Time Calculation    PT Received On 02/06/22  -RP      PT Goal Re-Cert Due Date 02/15/22  -RP              Time Calculation- PT    Total Timed Code Minutes- PT 30 minute(s)  -RP              Untimed Charges    PT Eval/Re-eval Minutes 30  -RP              Total Minutes    Untimed Charges Total Minutes 30  -RP       Total Minutes 30  -RP            User Key  (r) = Recorded By, (t) = Taken By, (c) = Cosigned By    Initials Name Provider Type    RP Staci Cole, PT Physical Therapist              Therapy Charges for Today     Code Description Service Date Service Provider Modifiers Qty    27968 TX PHYSICAL THERAPY EVALUATION MOD COMPLEX 30 MINS 2/6/2022 Staci Cole, PT GP 1          PT G-Codes  Outcome Measure Options: AM-PAC 6 Clicks Basic Mobility (PT)  AM-PAC 6 Clicks Score (PT): 6    Staci Cole PT  2/6/2022

## 2022-02-06 NOTE — PLAN OF CARE
Goal Outcome Evaluation:  Plan of Care Reviewed With: patient        Progress: no change  Outcome Summary: Pt presents with significant BLE weakness, edema, wounds, and is dependent for bed mobility and transfers at this time. Pt has significant LE wounds and is at risk for skin tears and bed sores. Pt unable to sit edge of bed or stand due to LE pain and non weight bearing restrictions of RLE. Pt will benefit from skilled PT to improve safety and independence with bed mobility and transfers in order to transition to next place of care safely.

## 2022-02-06 NOTE — PLAN OF CARE
Problem: Skin Injury Risk Increased  Goal: Skin Health and Integrity  Outcome: Ongoing, Progressing   Goal Outcome Evaluation:  Plan of Care Reviewed With: patient        Progress: no change  Outcome Summary: Wound dressing changed by resource nurse until pt can be evaluated by wound nurse. Pt is weak and unable to bear weight to rt leg at this time. PT and OT ordered to evaluate and work with pt. No issues, concerns, or complaints at this time. Will continue to monitor, call light in reach.

## 2022-02-06 NOTE — PROGRESS NOTES
[facility]     Cardiology Progress Note    Patient Name: Renetta Bailey  : 1935  MRN: [id number]  Primary Care Physician:  Joanne Mcdermott APRN  Date of admission: 2022    Subjective   Subjective   CC: Chest pain    HPI:    Renetta Bailey is a 87 y.o. female with history of peripheral vascular disease admitted with CORONARY ARTERY DISEASE non-ST myocardial infarction. No chest pain at present time.    Objective     ROS:  Respiratory: No Cough, No Dyspnea  Cardiovascular: No CP Classic for Angina    Vitals:   Temp:  [97.3 °F (36.3 °C)-98.8 °F (37.1 °C)] 97.3 °F (36.3 °C)  Heart Rate:  [] 80  Resp:  [16-20] 18  BP: ()/(44-67) 120/48  Flow (L/min):  [2-4] 4  Physical Exam    Constitutional: Awake, alert, No acute distress   Eyes: PERRLA, sclerae anicteric, no conjunctival injection   HENT: NCAT, mucous membranes moist   Neck: Supple, no thyromegaly, no lymphadenopathy, trachea midline   Respiratory: Clear to auscultation bilaterally, nonlabored respirations    Cardiovascular: RRR, no murmurs, rubs, or gallops, palpable pedal pulses bilaterally   Musculoskeletal: No bilateral ankle edema, no clubbing or cyanosis to extremities   Neurologic: Oriented x 3, strength symmetric in all extremities, speech clear  Current medications:  !NOT ORDERED- rivaroxaban (Xarelto), , Does not apply, Daily With Dinner  carvedilol, 25 mg, Oral, Daily  cholecalciferol, 1,000 Units, Oral, Daily  furosemide, 40 mg, Oral, BID  gabapentin, 300 mg, Oral, Daily  levothyroxine, 125 mcg, Oral, QAM AC  lisinopril, 2.5 mg, Oral, Q24H  magnesium sulfate, 1 g, Intravenous, Once  potassium chloride, 20 mEq, Oral, Daily  Potassium Phosphate-NaCl, 15 mmol, Intravenous, Once  pravastatin, 40 mg, Oral, Nightly  sodium chloride, 10 mL, Intravenous, Q12H      Current IV drips:        Result Review       Result Review:  I have personally reviewed the results from the time of this admission to 2022 08:16 EST  and agree with these findings:  [x]  Laboratory  []  Microbiology  [x]  Radiology  [x]  EKG  [x]  Cardiology/Vascular   CBC    CBC 2/4/22 2/5/22 2/5/22 2/6/22     0431 1536    WBC 5.89 5.40  4.31   RBC 3.13 (A) 2.89 (A)  2.98 (A)   Hemoglobin 9.8 (A) 9.0 (A) 9.2 (A) 9.2 (A)   Hematocrit 30.7 (A) 27.6 (A) 28.6 (A) 28.2 (A)   MCV 98.1 (A) 95.5  94.6   MCH 31.3 31.1  30.9   MCHC 31.9 32.6  32.6   RDW 18.6 (A) 18.4 (A)  17.8 (A)   Platelets 176 158  161   (A) Abnormal value            CMP    CMP 2/4/22 2/5/22 2/6/22   Glucose 95 96 123 (A)   BUN 11 9 13   Creatinine 0.89 0.80 0.79   eGFR Non  Am 60 (A) 68 69   Sodium 140 139 138   Potassium 3.3 (A) 3.4 (A) 3.6   Chloride 105 104 101   Calcium 9.4 9.5 9.5   Albumin 3.50  3.10 (A)   Total Bilirubin 0.8     Alkaline Phosphatase 94     AST (SGOT) 12     ALT (SGPT) 7     (A) Abnormal value                       Telemetry reviewed  Cardiac Medications Reviewed.    Assessment/Plan   Assessment / Plan           Impression:   1.  Coronary artery disease non-ST myocardial infarction.  2.  Permanent atrial fibrillation with a controlled heart rate  3.  Peripheral vascular disease s/p amputation of the right toe.  4.  Essential hypertension.     Plan:   Hold Xarelto.  Probable cardiac cath on Monday.  Previous cardiac catheter showed 100% occluded LAD with collaterals.  Heparin as a bridge until procedure.  Continue current home meds.  Continue carvedilol.  Wound care for her recent toe surgery.    Electronically signed by Ruslan Boykin MD, 02/06/22, 8:16 AM EST.

## 2022-02-07 LAB
ALBUMIN SERPL-MCNC: 3.2 G/DL (ref 3.5–5.2)
ANION GAP SERPL CALCULATED.3IONS-SCNC: 8.6 MMOL/L (ref 5–15)
ANION GAP SERPL CALCULATED.3IONS-SCNC: 9.2 MMOL/L (ref 5–15)
APTT PPP: 40.7 SECONDS (ref 22.2–34.2)
APTT PPP: 44.3 SECONDS (ref 22.2–34.2)
BACTERIA SPEC AEROBE CULT: NO GROWTH
BASOPHILS # BLD AUTO: 0.03 10*3/MM3 (ref 0–0.2)
BASOPHILS NFR BLD AUTO: 0.6 % (ref 0–1.5)
BUN SERPL-MCNC: 11 MG/DL (ref 8–23)
BUN SERPL-MCNC: 13 MG/DL (ref 8–23)
BUN/CREAT SERPL: 15.3 (ref 7–25)
BUN/CREAT SERPL: 15.9 (ref 7–25)
CALCIUM SPEC-SCNC: 9.6 MG/DL (ref 8.6–10.5)
CALCIUM SPEC-SCNC: 9.7 MG/DL (ref 8.6–10.5)
CHLORIDE SERPL-SCNC: 100 MMOL/L (ref 98–107)
CHLORIDE SERPL-SCNC: 100 MMOL/L (ref 98–107)
CO2 SERPL-SCNC: 30.4 MMOL/L (ref 22–29)
CO2 SERPL-SCNC: 30.8 MMOL/L (ref 22–29)
CREAT SERPL-MCNC: 0.69 MG/DL (ref 0.57–1)
CREAT SERPL-MCNC: 0.85 MG/DL (ref 0.57–1)
DEPRECATED RDW RBC AUTO: 63.2 FL (ref 37–54)
DEPRECATED RDW RBC AUTO: 63.8 FL (ref 37–54)
EOSINOPHIL # BLD AUTO: 0.12 10*3/MM3 (ref 0–0.4)
EOSINOPHIL NFR BLD AUTO: 2.5 % (ref 0.3–6.2)
ERYTHROCYTE [DISTWIDTH] IN BLOOD BY AUTOMATED COUNT: 17.4 % (ref 12.3–15.4)
ERYTHROCYTE [DISTWIDTH] IN BLOOD BY AUTOMATED COUNT: 17.5 % (ref 12.3–15.4)
GFR SERPL CREATININE-BSD FRML MDRD: 63 ML/MIN/1.73
GFR SERPL CREATININE-BSD FRML MDRD: 80 ML/MIN/1.73
GLUCOSE SERPL-MCNC: 139 MG/DL (ref 65–99)
GLUCOSE SERPL-MCNC: 98 MG/DL (ref 65–99)
HCT VFR BLD AUTO: 28.5 % (ref 34–46.6)
HCT VFR BLD AUTO: 28.8 % (ref 34–46.6)
HCT VFR BLD AUTO: 29.2 % (ref 34–46.6)
HCT VFR BLD AUTO: 29.5 % (ref 34–46.6)
HGB BLD-MCNC: 9.1 G/DL (ref 12–15.9)
HGB BLD-MCNC: 9.3 G/DL (ref 12–15.9)
HGB BLD-MCNC: 9.4 G/DL (ref 12–15.9)
HGB BLD-MCNC: 9.4 G/DL (ref 12–15.9)
IMM GRANULOCYTES # BLD AUTO: 0.03 10*3/MM3 (ref 0–0.05)
IMM GRANULOCYTES NFR BLD AUTO: 0.6 % (ref 0–0.5)
INR PPP: 1.05 (ref 2–3)
LYMPHOCYTES # BLD AUTO: 0.81 10*3/MM3 (ref 0.7–3.1)
LYMPHOCYTES NFR BLD AUTO: 16.6 % (ref 19.6–45.3)
MAGNESIUM SERPL-MCNC: 2 MG/DL (ref 1.6–2.4)
MCH RBC QN AUTO: 31.4 PG (ref 26.6–33)
MCH RBC QN AUTO: 31.4 PG (ref 26.6–33)
MCHC RBC AUTO-ENTMCNC: 31.6 G/DL (ref 31.5–35.7)
MCHC RBC AUTO-ENTMCNC: 31.9 G/DL (ref 31.5–35.7)
MCV RBC AUTO: 98.7 FL (ref 79–97)
MCV RBC AUTO: 99.3 FL (ref 79–97)
MONOCYTES # BLD AUTO: 0.45 10*3/MM3 (ref 0.1–0.9)
MONOCYTES NFR BLD AUTO: 9.2 % (ref 5–12)
NEUTROPHILS NFR BLD AUTO: 3.45 10*3/MM3 (ref 1.7–7)
NEUTROPHILS NFR BLD AUTO: 70.5 % (ref 42.7–76)
NRBC BLD AUTO-RTO: 0 /100 WBC (ref 0–0.2)
PHOSPHATE SERPL-MCNC: 2.7 MG/DL (ref 2.5–4.5)
PLATELET # BLD AUTO: 163 10*3/MM3 (ref 140–450)
PLATELET # BLD AUTO: 182 10*3/MM3 (ref 140–450)
PMV BLD AUTO: 9.4 FL (ref 6–12)
PMV BLD AUTO: 9.9 FL (ref 6–12)
POTASSIUM SERPL-SCNC: 3.4 MMOL/L (ref 3.5–5.2)
POTASSIUM SERPL-SCNC: 3.8 MMOL/L (ref 3.5–5.2)
PROTHROMBIN TIME: 10.9 SECONDS (ref 9.4–12)
RBC # BLD AUTO: 2.9 10*6/MM3 (ref 3.77–5.28)
RBC # BLD AUTO: 2.99 10*6/MM3 (ref 3.77–5.28)
SODIUM SERPL-SCNC: 139 MMOL/L (ref 136–145)
SODIUM SERPL-SCNC: 140 MMOL/L (ref 136–145)
WBC NRBC COR # BLD: 4.89 10*3/MM3 (ref 3.4–10.8)
WBC NRBC COR # BLD: 5.16 10*3/MM3 (ref 3.4–10.8)

## 2022-02-07 PROCEDURE — 85610 PROTHROMBIN TIME: CPT | Performed by: INTERNAL MEDICINE

## 2022-02-07 PROCEDURE — 80069 RENAL FUNCTION PANEL: CPT | Performed by: PHYSICIAN ASSISTANT

## 2022-02-07 PROCEDURE — 93010 ELECTROCARDIOGRAM REPORT: CPT | Performed by: INTERNAL MEDICINE

## 2022-02-07 PROCEDURE — 85025 COMPLETE CBC W/AUTO DIFF WBC: CPT | Performed by: INTERNAL MEDICINE

## 2022-02-07 PROCEDURE — 83735 ASSAY OF MAGNESIUM: CPT | Performed by: PHYSICIAN ASSISTANT

## 2022-02-07 PROCEDURE — 93005 ELECTROCARDIOGRAM TRACING: CPT | Performed by: INTERNAL MEDICINE

## 2022-02-07 PROCEDURE — 85730 THROMBOPLASTIN TIME PARTIAL: CPT | Performed by: INTERNAL MEDICINE

## 2022-02-07 PROCEDURE — 80048 BASIC METABOLIC PNL TOTAL CA: CPT | Performed by: INTERNAL MEDICINE

## 2022-02-07 PROCEDURE — 85014 HEMATOCRIT: CPT | Performed by: PHYSICIAN ASSISTANT

## 2022-02-07 PROCEDURE — 99232 SBSQ HOSP IP/OBS MODERATE 35: CPT | Performed by: INTERNAL MEDICINE

## 2022-02-07 PROCEDURE — 85027 COMPLETE CBC AUTOMATED: CPT | Performed by: PHYSICIAN ASSISTANT

## 2022-02-07 PROCEDURE — 25010000002 HEPARIN (PORCINE) PER 1000 UNITS: Performed by: PHYSICIAN ASSISTANT

## 2022-02-07 PROCEDURE — 85018 HEMOGLOBIN: CPT | Performed by: PHYSICIAN ASSISTANT

## 2022-02-07 RX ADMIN — FUROSEMIDE 40 MG: 40 TABLET ORAL at 09:44

## 2022-02-07 RX ADMIN — SODIUM CHLORIDE, PRESERVATIVE FREE 10 ML: 5 INJECTION INTRAVENOUS at 21:37

## 2022-02-07 RX ADMIN — LEVOTHYROXINE SODIUM 125 MCG: 125 TABLET ORAL at 07:58

## 2022-02-07 RX ADMIN — FUROSEMIDE 40 MG: 40 TABLET ORAL at 18:37

## 2022-02-07 RX ADMIN — CARVEDILOL 25 MG: 25 TABLET, FILM COATED ORAL at 09:44

## 2022-02-07 RX ADMIN — LISINOPRIL 2.5 MG: 2.5 TABLET ORAL at 09:44

## 2022-02-07 RX ADMIN — POTASSIUM CHLORIDE 20 MEQ: 1.5 POWDER, FOR SOLUTION ORAL at 09:44

## 2022-02-07 RX ADMIN — PRAVASTATIN SODIUM 40 MG: 40 TABLET ORAL at 21:36

## 2022-02-07 RX ADMIN — Medication 1000 UNITS: at 09:44

## 2022-02-07 RX ADMIN — HEPARIN SODIUM 14 UNITS/KG/HR: 5000 INJECTION INTRAVENOUS; SUBCUTANEOUS at 09:56

## 2022-02-07 RX ADMIN — GABAPENTIN 300 MG: 300 CAPSULE ORAL at 09:44

## 2022-02-07 NOTE — PLAN OF CARE
Goal Outcome Evaluation: Heparin drip infusing without difficulty. NPO since 6AM per Dr Boykin. Pt has slept well this shift. Son at bedside.  Plan of Care Reviewed With: patient        Progress: no change

## 2022-02-07 NOTE — PLAN OF CARE
Problem: Skin Injury Risk Increased  Goal: Skin Health and Integrity  Outcome: Ongoing, Progressing   Goal Outcome Evaluation:  Plan of Care Reviewed With: patient        Progress: no change  Outcome Summary: Wound nurse came in and assessed / adjusted orders and replaced dressings, Pt tolerated well. Pt waiting to go to cath lab. No changes or issues at this time. Will continue to monitor, call light in reach.

## 2022-02-07 NOTE — PROGRESS NOTES
[facility]   Hospitalist Progress Note  Date: 2022  Patient Name: Renetta Bailey  : 1935  MRN: [id number]  Date of admission: 2022      Subjective   Subjective     Chief Complaint: Chest pain    Summary: Renetta Bailey is a 86 y.o. female with medical history significant for atrial fibrillation, congestive heart failure, CAD, hyperlipidemia, hypertension; [facility] patient evaluated there was noted to have elevated troponin patient's cardiologist Dr. Best was contacted and recommended transferring to our facility for further medical evaluation and treatment.  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  Due to poor circulation, patient was supposed to have stents placed in her lower extremity.  Son reports that during procedure, patient started to complain of chest pain and procedure was stopped midway.  Patient was only able to have 1 stent placed out of the 3 that were needed.  Son reports that they were told that patient had a heart attack during the procedure.  Patient was then transferred to another facility and they discussed open heart surgery.  Patient was not agreeable to surgery.  Son reports that patient has been told in the past that she needed open heart surgery.  Son reports that patient underwent a cardiac cath many years ago.  Open heart surgery was recommended at that time, but they noted that patient did have collaterals and stated that patient would not be a good candidate for open heart.  Patient reports that she is not interested in having open heart surgery at the age of 86.  Since patient's amputation 1 week ago, patient has had decreased activity and appetite.  Son reports that patient has been instructed not to bear any weight on her right foot.      Interval Followup:   Patient seen and examined resting comfortably in bed without chest pain shortness of  air palpitations.  Remains on heparin drip H&H remained stable lower extremity wounds stable wound team to evaluate today.  Ultimately patient will need close follow-up with vascular surgery at [place].  Going for cardiac catheterization later today per cardiology.  Once stable from a cardiac standpoint will discharge home.    Review of systems:  All systems reviewed negative except the following:  Patient with right lower extremity pain from her wounds.  Denies chest pain shortness of air palpitations at this time.    Objective   Objective     Vitals:   Temp:  [98.2 °F (36.8 °C)-98.6 °F (37 °C)] 98.5 °F (36.9 °C)  Heart Rate:  [71-84] 81  Resp:  [16-17] 16  BP: (105-110)/(35-55) 110/55  Flow (L/min):  [1-4] 1        Physical Exam    Constitutional: Awake, alert, no acute distress   Eyes: Pupils equal, sclerae anicteric, no conjunctival injection   HENT: NCAT, mucous membranes moist   Neck: Supple, no thyromegaly, no lymphadenopathy, trachea midline   Respiratory: Clear to auscultation bilaterally, nonlabored respirations    Cardiovascular: RRR, no murmurs, rubs, or gallops, palpable pedal pulses bilaterally   Gastrointestinal: Positive bowel sounds, soft, nontender, nondistended   Musculoskeletal: No bilateral ankle edema, no clubbing or cyanosis to extremities   Psychiatric: Appropriate affect, cooperative   Neurologic: Oriented x 3, strength symmetric in all extremities, Cranial Nerves grossly intact to confrontation, speech clear   Skin: Extensive wound on right foot from previous right toe amputation large right calf hematoma    Result Review    Result Review:  I have personally reviewed the results from the time of this admission to 2/7/2022 11:53 EST and agree with these findings:  [x]  Laboratory  []  Microbiology  [x]  Radiology  [x]  EKG/Telemetry   []  Cardiology/Vascular   []  Pathology  []  Old records  []  Other:    Assessment/Plan   Assessment / Plan   Assessment:    · Non-ST segment elevation  MI  · Chest pain secondary to above  · Coronary artery disease with most recent cardiac catheterization done in 2007 showing 100% occlusion of the LAD  · Chronic atrial fibrillation on Xarelto  · History of pacemaker placement  · Combined systolic/diastolic congestive heart failure last known ejection fraction estimated 35 to 40% on echocardiogram 12/8/2021  · Peripheral arterial disease  · Hypertension  · Hyperlipidemia      Plan:    · Continue cardiac telemetry  · Continue beta-blocker ACE inhibitor Lasix and potassium supplementation  · Replacing phosphorus magnesium intravenously  · Holding Xarelto for cardiac catheterization  · Continuing heparin drip per ACS protocol minus the bolus  · Monitoring H&H  · [place] catheter remains in place likely discontinue tomorrow after procedure.  · Wound care consultation recommendations appreciated  · Cardiology consultation appreciated going for cardiac catheterization today  · Further treatment is patient's contingent upon her hospital course  · Discussed with RN discussed with patient patient's son at the bedside  · Patient patient's son plan on going home after current work-up do not want rehab placement.      DVT prophylaxis:  Medical DVT prophylaxis orders are present.    CODE STATUS:   Level Of Support Discussed With: Patient  Code Status (Patient has no pulse and is not breathing): CPR (Attempt to Resuscitate)  Medical Interventions (Patient has pulse or is breathing): Full Support  Electronically signed by SHARON Vigil, 02/07/22, 11:55 AM EST.        Attending Note:  I have seen and examined the patient and agree with the above information from Kane Beckford PA-C.  86-year-old female with A. fib, CHF, advanced coronary disease who presented from [facility] with chest pain, found to have elevated troponin.  Her cardiologist is here so she was transferred here.  Apparently, she had a toe amputation and a skin graft recently because she dropped some  potatoes on her toe.  She had an MI while she was getting surgery, required transfer to [facility] in [place].  Reportedly she has severe heart disease that was not amenable to open heart surgery and she did not want surgery anyway.  Since her amputation 1 week prior she has had decreased appetite and activity.  She presented to [facility] due to chest pain, was found to have an elevated troponin.  Apparently she was unable to get back to [facility] where she went for her heart attack and was transferred here to be under the care of Dr. Best, her cardiologist.  She is to get cardiac cath today.  Possibly can discharge home tomorrow, will need to follow-up with her wound care and surgical team.  Son feels like her wounds look better than previous.  Our wound care nurse saw her today, appreciate the assistance.  Otherwise on exam she is in no distress, S1-S2, appears comfortable.  Electronically signed by Ralf Bellamy MD, 02/07/22, 6:45 PM EST.

## 2022-02-07 NOTE — PAYOR COMM NOTE
"Kel Bailey (87 y.o. Female)             Date of Birth Social Security Number Address Home Phone MRN    1935  [address] [address] [phone] [id number]    Congregational Marital Status             [employer]        Admission Date Admission Type Admitting Provider Attending Provider Department, Room/Bed    2/4/22 Urgent SarinasJanna carcamo MD Huff, John A., MD [facility] PROGRESSIVE CARE UNIT, 205/1    Discharge Date Discharge Disposition Discharge Destination                         Attending Provider: Ralf Bellamy MD    Allergies: Drug [Tramadol Hcl Er], Morphine    Isolation: None   Infection: None   Code Status: CPR   Advance Care Planning Activity    Ht: 160 cm (63\")   Wt: 73 kg (160 lb 15 oz)    Admission Cmt: None   Principal Problem: None                Active Insurance as of 2/4/2022     Primary Coverage     Payor Plan Insurance Group Employer/Plan Group    [facility] MEDICARE REPLACEMENT [facility] MEDICARE REPLACEMENT [id number]     Payor Plan Address Payor Plan Phone Number Payor Plan Fax Number Effective Dates    [address] [address] [phone]  2/4/2022 - None Entered    [facility] [id number]       Subscriber Name Subscriber Birth Date Member ID       KEL BAILEY 1935 [id number]           Secondary Coverage     Payor Plan Insurance Group Employer/Plan Group    [facility] SUPP [id number]     Payor Plan Address Payor Plan Phone Number Payor Plan Fax Number Effective Dates    [address]   4/1/2020 - None Entered    [facility] [id number]       Subscriber Name Subscriber Birth Date Member ID       KEL BAILEY 1935 [id number]                 Emergency Contacts      (Rel.) Home Phone Work Phone Mobile Phone    KATHYDAMIAN DAILY (Son) -- -- [phone]    RICHOLIVA (Son) -- -- [phone]        #[id number]    CONTACT   CRYSTAL S UTILIZATION REVIEW    [facility]  [address] [place] [address]  TAX ID [id number]  [facility]  [id number]       [phone]   [phone]    Physician Progress Notes (last 24 hours)  Notes from 22 1145 through 22 114   No notes of this type exist for this encounter.         Ruslan Boykin MD   Physician   Cardiology   Progress Notes       Signed   Date of Service:  22   Creation Time:  22              Signed        Expand All Collapse All        Show:Clear all  [x]Manual[x]Template[x]Copied    Added by:  [x]Ruslan Boykin MD      []Harshil for details     [facility]     Cardiology Progress Note     Patient Name: Renetta Bailey  : 1935  MRN: [id number]  Primary Care Physician:  Joanne Mcdermott, WILFRID  Date of admission: 2022        Subjective []Expand by Default     Subjective   CC: Chest pain     HPI:     Renetta Bailey is a 87 y.o. female with history of peripheral vascular disease admitted with CORONARY ARTERY DISEASE non-ST myocardial infarction. No chest pain at present time.     Objective      ROS:  Respiratory: No Cough, No Dyspnea  Cardiovascular: No CP Classic for Angina     Vitals:   Temp:  [97.3 °F (36.3 °C)-98.8 °F (37.1 °C)] 97.3 °F (36.3 °C)  Heart Rate:  [] 80  Resp:  [16-20] 18  BP: ()/(44-67) 120/48  Flow (L/min):  [2-4] 4  Physical Exam                         Constitutional: Awake, alert, No acute distress              Eyes: PERRLA, sclerae anicteric, no conjunctival injection              HENT: NCAT, mucous membranes moist              Neck: Supple, no thyromegaly, no lymphadenopathy, trachea midline              Respiratory: Clear to auscultation bilaterally, nonlabored respirations               Cardiovascular: RRR, no murmurs, rubs, or gallops, palpable pedal pulses bilaterally              Musculoskeletal: No bilateral ankle edema, no clubbing or cyanosis to extremities              Neurologic: Oriented x 3, strength symmetric in all  extremities, speech clear  Current medications:  !NOT ORDERED- rivaroxaban (Xarelto), , Does not apply, Daily With Dinner  carvedilol, 25 mg, Oral, Daily  cholecalciferol, 1,000 Units, Oral, Daily  furosemide, 40 mg, Oral, BID  gabapentin, 300 mg, Oral, Daily  levothyroxine, 125 mcg, Oral, QAM AC  lisinopril, 2.5 mg, Oral, Q24H  magnesium sulfate, 1 g, Intravenous, Once  potassium chloride, 20 mEq, Oral, Daily  Potassium Phosphate-NaCl, 15 mmol, Intravenous, Once  pravastatin, 40 mg, Oral, Nightly  sodium chloride, 10 mL, Intravenous, Q12H        Current IV drips:           Result Review          Result Review:  I have personally reviewed the results from the time of this admission to 2/6/2022 08:16 EST and agree with these findings:  [x]?  Laboratory  []?  Microbiology  [x]?  Radiology  [x]?  EKG    [x]?  Cardiology/Vascular   CBC Results          CBC    CBC 2/4/22 2/5/22 2/5/22 2/6/22       0431 1536     WBC 5.89 5.40   4.31   RBC 3.13 (A) 2.89 (A)   2.98 (A)   Hemoglobin 9.8 (A) 9.0 (A) 9.2 (A) 9.2 (A)   Hematocrit 30.7 (A) 27.6 (A) 28.6 (A) 28.2 (A)   MCV 98.1 (A) 95.5   94.6   MCH 31.3 31.1   30.9   MCHC 31.9 32.6   32.6   RDW 18.6 (A) 18.4 (A)   17.8 (A)   Platelets 176 158   161   (A) Abnormal value                  CMP Results:         CMP    CMP 2/4/22 2/5/22 2/6/22   Glucose 95 96 123 (A)   BUN 11 9 13   Creatinine 0.89 0.80 0.79   eGFR Non  Am 60 (A) 68 69   Sodium 140 139 138   Potassium 3.3 (A) 3.4 (A) 3.6   Chloride 105 104 101   Calcium 9.4 9.5 9.5   Albumin 3.50   3.10 (A)   Total Bilirubin 0.8       Alkaline Phosphatase 94       AST (SGOT) 12       ALT (SGPT) 7       (A) Abnormal value                                 Telemetry reviewed  Cardiac Medications Reviewed.           Assessment/Plan      Assessment / Plan            Impression:   1.  Coronary artery disease non-ST myocardial infarction.  2.  Permanent atrial fibrillation with a controlled heart rate  3.  Peripheral vascular disease  s/p amputation of the right toe.  4.  Essential hypertension.     Plan:   Hold Xarelto.  Probable cardiac cath on Monday.  Previous cardiac catheter showed 100% occluded LAD with collaterals.  Heparin as a bridge until procedure.  Continue current home meds.  Continue carvedilol.  Wound care for her recent toe surgery.     Electronically signed by Ruslan Boykin MD, 22, 8:16 AM EST.                                      Ralf Bellamy MD   Physician   Hospitalist   Progress Notes       Signed   Date of Service:  22   Creation Time:  22              Signed        Expand All Collapse All        Show:Clear all  [x]Manual[x]Template[x]Copied    Added by:  [x]Ralf Bellamy MD[x]Kane Beckford PA      []Harshil for details     [facility]   Hospitalist Progress Note  Date: 2022  Patient Name: Renetta Bailey  : 1935  MRN: [id number]  Date of admission: 2022           Subjective []Expand by Default     Subjective      Chief Complaint: Chest pain     Summary: Renetta Bailey is a 86 y.o. female with medical history significant for atrial fibrillation, congestive heart failure, CAD, hyperlipidemia, hypertension; presents [facility] with chest pain times several days patient evaluated there was noted to have elevated troponin patient's cardiologist Dr. Best was contacted and recommended transferring to our facility for further medical evaluation and treatment.  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  Due to poor circulation, patient was supposed to have stents placed in her lower extremity.  Son reports that during procedure, patient started to complain of chest pain and procedure was stopped midway.  Patient was only able to have 1 stent placed out of the 3 that were needed.  Son reports that they were told that patient had a heart attack during the procedure.  Patient was then transferred to  another facility and they discussed open heart surgery.  Patient was not agreeable to surgery.  Son reports that patient has been told in the past that she needed open heart surgery.  Son reports that patient underwent a cardiac cath many years ago.  Open heart surgery was recommended at that time, but they noted that patient did have collaterals and stated that patient would not be a good candidate for open heart.  Patient reports that she is not interested in having open heart surgery at the age of 86.  Since patient's amputation 1 week ago, patient has had decreased activity and appetite.  Son reports that patient has been instructed not to bear any weight on her right foot.       Interval Followup:    Patient seen and examined today patient without chest pain no shortness of air no palpitations.  Xarelto on hold for possible cardiac catheterization tomorrow we will start heparin drip today as bridge.  Continuing with wound care.  Patient with Shipley catheter in will discontinue after procedure tomorrow.     Review of systems:  All systems reviewed negative except the following:  Patient with right lower extremity pain from her wounds.  Denies chest pain shortness of air palpitations at this time.           Objective      Objective      Vitals:   Temp:  [97.3 °F (36.3 °C)-98.8 °F (37.1 °C)] 97.3 °F (36.3 °C)  Heart Rate:  [] 80  Resp:  [17-20] 18  BP: ()/(48-67) 120/48  Flow (L/min):  [2-4] 4           Physical Exam                         Constitutional: Awake, alert, no acute distress              Eyes: Pupils equal, sclerae anicteric, no conjunctival injection              HENT: NCAT, mucous membranes moist              Neck: Supple, no thyromegaly, no lymphadenopathy, trachea midline              Respiratory: Clear to auscultation bilaterally, nonlabored respirations               Cardiovascular: RRR, no murmurs, rubs, or gallops, palpable pedal pulses bilaterally              Gastrointestinal:  Positive bowel sounds, soft, nontender, nondistended              Musculoskeletal: No bilateral ankle edema, no clubbing or cyanosis to extremities              Psychiatric: Appropriate affect, cooperative              Neurologic: Oriented x 3, strength symmetric in all extremities, Cranial Nerves grossly intact to confrontation, speech clear              Skin: Extensive wound on right foot from previous right toe amputation large right calf hematoma           Result Review       Result Review:  I have personally reviewed the results from the time of this admission to 2/6/2022 10:45 EST and agree with these findings:  [x]?  Laboratory  []?  Microbiology  [x]?  Radiology  [x]?  EKG/Telemetry   []?  Cardiology/Vascular   []?  Pathology  []?  Old records  []?  Other:           Assessment/Plan      Assessment / Plan   Assessment:     · Non-ST segment elevation MI  · Chest pain secondary to above  · Coronary artery disease with most recent cardiac catheterization done in 2007 showing 100% occlusion of the LAD  · Chronic atrial fibrillation on Xarelto  · History of pacemaker placement  · Combined systolic/diastolic congestive heart failure last known ejection fraction estimated 35 to 40% on echocardiogram 12/8/2021  · Peripheral arterial disease  · Hypertension  · Hyperlipidemia        Plan:     · Continue cardiac telemetry  · Continue beta-blocker ACE inhibitor Lasix and potassium supplementation  · Replacing phosphorus magnesium intravenously  · Holding Xarelto for possible cardiac catheterization tomorrow  · Starting heparin drip per ACS protocol minus the bolus  · Monitoring H&H  · Shipley catheter remains in place likely discontinue tomorrow after procedure.  · Continue with topical woundcare / wound nurse consultation  · Cardiology consultation appreciated continue care per the recommendations  · Further treatment is patient's contingent upon her hospital course  · Discussed with RN discussed with patient patient's  son at the bedside  · Patient patient's son plan on going home after current work-up do not want rehab placement.        DVT prophylaxis:  Medical DVT prophylaxis orders are present.     CODE STATUS:   Level Of Support Discussed With: Patient  Code Status (Patient has no pulse and is not breathing): CPR (Attempt to Resuscitate)  Medical Interventions (Patient has pulse or is breathing): Full Support           Electronically signed by SHARON Vigil, 02/06/22, 10:59 AM EST.        Attending Note:  I have seen and examined the patient and agree with the above information from Kane Beckford PA-C.  86-year-old female with A. fib, CHF, advanced coronary disease who presented from [facility] with chest pain, found to have elevated troponin.  Her cardiologist is here so she was transferred here.  Apparently, she had a toe amputation and a skin graft recently because she dropped some potatoes on her toe.  She had an MI while she was getting surgery, required transfer to [facility] in [place].  Reportedly she has severe heart disease that was not amenable to open heart surgery and she did not want surgery anyway.  Since her amputation 1 week prior she has had decreased appetite and activity.  She presented to [facility] due to chest pain, was found to have an elevated troponin.  Apparently she was unable to get back to [facility] where she went for her heart attack and was transferred here to be under the care of Dr. Best, her cardiologist.  She will be on a heparin drip prior to possible cardiac cath.  Wound care to legs.  Otherwise stable.  Electronically signed by Ralf Bellamy MD, 02/06/22, 7:41 PM EST.                              Revision History                                  Lucila Mensah, RN   Registered Nurse      Plan of Care   Signed   Date of Service:  02/06/22 1746   Creation Time:  02/06/22 1746              Signed              Show:Clear  all  []Manual[x]Template[]Copied    Added by:  [x]Lucila Mensah, RN      []Harshil for details       Problem: Skin Injury Risk Increased  Goal: Skin Health and Integrity  Outcome: Ongoing, Progressing   Goal Outcome Evaluation:  Plan of Care Reviewed With: patient  Progress: no change  Outcome Summary: Wound dressing changed by resource nurse until pt can be evaluated by wound nurse. Pt is weak and unable to bear weight to rt leg at this time. PT and OT ordered to evaluate and work with pt. No issues, concerns, or complaints at this time. Will continue to monitor, call light in reach.                  Tashia Sheridan, RN   Registered Nurse      Plan of Care   Signed   Date of Service:  02/07/22 0631   Creation Time:  02/07/22 0631              Signed              Show:Clear all  [x]Manual[x]Template[]Copied    Added by:  [x]Tashia Sheridan, CHERYLE      []Harshil for details    Goal Outcome Evaluation: Heparin drip infusing without difficulty. NPO since 6AM per Dr Boykin. Pt has slept well this shift. Son at bedside.  Plan of Care Reviewed With: patient  Progress: no change                    Sarahi Pinzon RD   Registered Dietitian   Nutrition   Consults       Signed   Date of Service:  02/07/22 1043   Creation Time:  02/07/22 1043              Signed        Expand All Collapse All        Show:Clear all  [x]Manual[x]Template[]Copied    Added by:  [x]Sarahi Pinzon RD      []Harshil for details    Nutrition Services     Patient Name: Renetta Bailey  YOB: 1935  MRN: [id number]  Admission date: 2/4/2022        CLINICAL NUTRITION ASSESSMENT        Reason for Assessment  Nonhealing wound or pressure ulcer   H&P:     Medical History         Past Medical History:   Diagnosis Date   • Atrial fibrillation (CMS/HCC)     • CHF (congestive heart failure) (CMS/HCC)     • Coronary artery disease     • Hyperlipidemia     • Hypertension               Current Problems:         Active Hospital Problems     Diagnosis  "    • NSTEMI (non-ST elevated myocardial infarction) ([facility])     • Atrial fibrillation ([facility])     • Congestive heart failure (CHF) ([facility])     • Coronary artery disease involving native coronary artery of native heart with unstable angina pectoris ([facility])     • Hyperlipidemia     • Primary hypertension            Nutrition/Diet History            Narrative      RD consult for wounds to right anterior foot, right lower leg, right calf, & stage II pressure injury to sacral spine.  Hx of PVD, s/p right toe amputation recently.  Awaiting wound nurse consult to stage other wounds.  Pt eating % of meals since admission.  Fe+ 28, consider supplementation.        Anthropometrics           Current Height, Weight Height: 160 cm (63\")  Weight: 73 kg (160 lb 15 oz)   Current BMI Body mass index is 28.51 kg/m².         Weight Hx       Wt Readings from Last 30 Encounters:   02/04/22 1540 73 kg (160 lb 15 oz)   09/21/21 0933 71.2 kg (157 lb)   07/27/21 1154 74.8 kg (165 lb)   03/16/21 0000 75.3 kg (166 lb)   11/10/20 0000 74.4 kg (164 lb)   09/15/20 0000 72.6 kg (160 lb)   05/19/20 0000 71.7 kg (158 lb)                Wt Change Observation 5# wt loss since July 3% insignificant change          Estimated/Assessed Needs         Energy Requirements     EST Needs (kcal/day) 8912-9963         Protein Requirements     EST Daily Needs (g/day)  (1.2-1.5)         Fluid Requirements      Estimated Needs (mL/day) 1700      Labs/Medications            Pertinent Labs Reviewed.            Results from last 7 days   Lab Units 02/07/22  0308 02/06/22  0450 02/05/22  0431 02/04/22  1802 02/04/22  1802   SODIUM mmol/L 139 138 139   < > 140   POTASSIUM mmol/L 3.8 3.6 3.4*   < > 3.3*   CHLORIDE mmol/L 100 101 104   < > 105   CO2 mmol/L 30.4* 27.8 26.9   < > 23.4   BUN mg/dL 13 13 9   < > 11   CREATININE mg/dL 0.85 0.79 0.80   < > 0.89   CALCIUM mg/dL 9.6 9.5 9.5   < > 9.4   BILIRUBIN mg/dL  --   --   --   --  0.8   ALK PHOS U/L  --   --   --   " --  94   ALT (SGPT) U/L  --   --   --   --  7   AST (SGOT) U/L  --   --   --   --  12   GLUCOSE mg/dL 139* 123* 96   < > 95    < > = values in this interval not displayed.                 Results from last 7 days   Lab Units 02/07/22  0823 02/07/22  0308 02/07/22  0308 02/06/22  1603 02/06/22  0450 02/05/22  1536 02/05/22  0431   MAGNESIUM mg/dL  --   --  2.0  --  1.7  --  1.7   PHOSPHORUS mg/dL  --   --  2.7  --  2.4*   < >  --    HEMOGLOBIN g/dL 9.3*   < > 9.1*   < > 9.2*   < > 9.0*   HEMATOCRIT % 28.5*   < > 28.8*   < > 28.2*   < > 27.6*   TRIGLYCERIDES mg/dL  --   --   --   --  106  --   --     < > = values in this interval not displayed.        Pertinent Medications Reviewed.          Current Nutrition Orders & Evaluation of Intake         Oral Nutrition      Current PO Diet Diet Regular; Cardiac   Supplement No active supplement orders         Nutrition Diagnosis            Nutrition Dx Problem 1 Increased nutrient needs related to various wounds on right leg & stage II PI to sacral spine as evidenced by wound documentation & estimated energy needs      Nutrition Intervention             Continue Cardiac diet  Add Anjel BID for arginine & wound healing      Medical Nutrition Therapy/Nutrition Education             Learner     Readiness Patient  Awaiting wound nurse consult before educating patient      Monitor/Evaluation           Monitor PO intake, Pertinent labs, Skin status      Nutrition Discharge Plan             To be determined      Electronically signed by:  Sarahi Pinzon RD  02/07/22 10:44 EST                                 Myocardial Infarction RRG - Care Day 1 (2/6/2022) by Melanie Lee, RN         Not Met: Reviewed on 2/6/2022 by Melanie Lee, RN       Created Using Review Status Review Entered   [employer]® Completed 2/6/2022 11:46       Criteria Set Name - Subset   Myocardial Infarction RRG - Care Day 1      Criteria Review      Care Day: 1 Care Date: 2/6/2022 Level of Care:    Guideline Day 1     Discharge Readiness    (X) * Hemodynamic stability    (X) * Dangerous arrhythmia absent    (X) * Chest pain, dyspnea, or anginal equivalent absent    ( ) * Oxygen absent    2/6/2022 11:46:38 EST by Melanie Lee      O2 at 4 liters    (X) * No evidence of bleeding    (X) * No evidence of recurrent myocardial ischemia    (X) * Vascular access site without evidence of infection, aneurysm, or growing hematoma    (X) * Renal function at baseline or acceptable for next level of care    ( ) * Ambulatory or acceptable for next level of care    2/6/2022 11:46:38 EST by Melanie Lee      requires assist to ambulate    (X) * Oral hydration    ( ) * Oral medications or regimen acceptable for next level of care    2/6/2022 11:46:38 EST by Melanie Lee      Heparin gtt,  potassium phosphate IV- phosp-2.4    (X) * Oral diet or acceptable for next level of care    ( ) * Discharge plans and education understood    Variance(s):    (Primary)    Category: Complication, comorbidity, physiologic delay    Reason: Slower than guideline; poor physiology    Context: A one-day delay compared to the Optimal Recovery Course attributed to a patient with general poor conditioning or worse than expected physiologic response to illness or injury.    Resolution:    Reviewed By: Melanie Lee on 2/6/2022 11:46:38 EST       * Milestone             Myocardial Infarction RRG - Inpatient Care by Melanie Lee RN         Met (Selected): Reviewed on 2/5/2022 by Melanie Lee RN       Created Using Review Status Review Entered   [employer]® Completed 2/5/2022 13:38       Criteria Set Name - Subset   Myocardial Infarction RRG - Inpatient Care       Selected?   Yes - Inpatient Care is selected for the Myocardial Infarction RRG criteria set.      Criteria Review      Inpatient Care    Most Recent : Melanie Lee Most Recent Date: 2/5/2022 13:38:42 EST    (X) Admission is indicated for  1 or more  of the following :       (X) Acute myocardial  infarction (MI) [E] (not in context of cardiac procedure within last 48 hours),       as indicated by  ALL  of the following :          (X) Elevated cardiac troponin level, as indicated by  1 or more  of the following :             (X) New or presumed new elevation of cardiac troponin level (ie, elevation clearly not due to             chronic condition, see footnotes) [F] [G] [H]             2/5/2022 09:07:33 EST by Melanie Lee               0.184          (X) Myocardial injury due to acute ischemia, as indicated by  1 or more  of the following :             (X) Symptoms consistent with myocardial ischemia (eg, chest pain, dyspnea)             2/5/2022 09:07:33 EST by Melanie Lee               c/o chest pain             (X) New or presumed new ECG changes consistent with ischemia (eg, ST-segment change, left bundle             branch block)             2/5/2022 13:38:42 EST by Melanie Lee               Atrial fibrillation  Ventricular premature complex  Right bundle branch block  Borderline ST depression, lateral leads    Notes:    2/5/2022 09:07:33 EST by Melanie Lee    Subject: Admission    NSTEMI      hold xarelto- plan for cardiac cath Monday       Previous cardiac catheter showed 100% occluded LAD with collaterals.

## 2022-02-07 NOTE — CONSULTS
"Nutrition Services    Patient Name: Renetta Bailey  YOB: 1935  MRN: [id number]  Admission date: 2/4/2022      CLINICAL NUTRITION ASSESSMENT      Reason for Assessment  Nonhealing wound or pressure ulcer   H&P:    Past Medical History:   Diagnosis Date   • Atrial fibrillation (CMS/[facility])    • CHF (congestive heart failure) (CMS/[facility]ly [facility])    • Coronary artery disease    • Hyperlipidemia    • Hypertension         Current Problems:   Active Hospital Problems    Diagnosis    • NSTEMI (non-ST elevated myocardial infarction) ([facility])    • Atrial fibrillation ([facility])    • Congestive heart failure (CHF) ([facility])    • Coronary artery disease involving native coronary artery of native heart with unstable angina pectoris ([facility])    • Hyperlipidemia    • Primary hypertension         Nutrition/Diet History         Narrative     RD consult for wounds to right anterior foot, right lower leg, right calf, & stage II pressure injury to sacral spine.  Hx of PVD, s/p right toe amputation recently.  Awaiting wound nurse consult to stage other wounds.  Pt eating % of meals since admission.  Fe+ 28, consider supplementation.       Anthropometrics        Current Height, Weight Height: 160 cm (63\")  Weight: 73 kg (160 lb 15 oz)   Current BMI Body mass index is 28.51 kg/m².       Weight Hx  Wt Readings from Last 30 Encounters:   02/04/22 1540 73 kg (160 lb 15 oz)   09/21/21 0933 71.2 kg (157 lb)   07/27/21 1154 74.8 kg (165 lb)   03/16/21 0000 75.3 kg (166 lb)   11/10/20 0000 74.4 kg (164 lb)   09/15/20 0000 72.6 kg (160 lb)   05/19/20 0000 71.7 kg (158 lb)            Wt Change Observation 5# wt loss since July 3% insignificant change     Estimated/Assessed Needs       Energy Requirements    EST Needs (kcal/day) 7986-6441       Protein Requirements    EST Daily Needs (g/day)  (1.2-1.5)       Fluid Requirements     Estimated Needs (mL/day) 1700     Labs/Medications         Pertinent Labs Reviewed.   Results from last 7 days "   Lab Units 02/07/22  0308 02/06/22  0450 02/05/22  0431 02/04/22  1802 02/04/22  1802   SODIUM mmol/L 139 138 139   < > 140   POTASSIUM mmol/L 3.8 3.6 3.4*   < > 3.3*   CHLORIDE mmol/L 100 101 104   < > 105   CO2 mmol/L 30.4* 27.8 26.9   < > 23.4   BUN mg/dL 13 13 9   < > 11   CREATININE mg/dL 0.85 0.79 0.80   < > 0.89   CALCIUM mg/dL 9.6 9.5 9.5   < > 9.4   BILIRUBIN mg/dL  --   --   --   --  0.8   ALK PHOS U/L  --   --   --   --  94   ALT (SGPT) U/L  --   --   --   --  7   AST (SGOT) U/L  --   --   --   --  12   GLUCOSE mg/dL 139* 123* 96   < > 95    < > = values in this interval not displayed.     Results from last 7 days   Lab Units 02/07/22  0823 02/07/22  0308 02/07/22  0308 02/06/22  1603 02/06/22  0450 02/05/22  1536 02/05/22  0431   MAGNESIUM mg/dL  --   --  2.0  --  1.7  --  1.7   PHOSPHORUS mg/dL  --   --  2.7  --  2.4*   < >  --    HEMOGLOBIN g/dL 9.3*   < > 9.1*   < > 9.2*   < > 9.0*   HEMATOCRIT % 28.5*   < > 28.8*   < > 28.2*   < > 27.6*   TRIGLYCERIDES mg/dL  --   --   --   --  106  --   --     < > = values in this interval not displayed.       Pertinent Medications Reviewed.     Current Nutrition Orders & Evaluation of Intake       Oral Nutrition     Current PO Diet Diet Regular; Cardiac   Supplement No active supplement orders       Nutrition Diagnosis         Nutrition Dx Problem 1 Increased nutrient needs related to various wounds on right leg & stage II PI to sacral spine as evidenced by wound documentation & estimated energy needs     Nutrition Intervention         Continue Cardiac diet  Add Anjel BID for arginine & wound healing     Medical Nutrition Therapy/Nutrition Education          Learner     Readiness Patient  Awaiting wound nurse consult before educating patient     Monitor/Evaluation        Monitor PO intake, Pertinent labs, Skin status     Nutrition Discharge Plan         To be determined     Electronically signed by:  Sarahi Pinzon RD  02/07/22 10:44 EST

## 2022-02-07 NOTE — PROGRESS NOTES
I was asked to see Ms. Bailey for consideration of LHC (and possible PCI) due to her NSTEMI.  The risks (death, heart attack, stroke, loss of limb, infection, arterial dissection, coronary artery perforation and potential pericardial tamponade, severe bleeding and potential need for blood transfusion, renal failure and potential need for permanent dialysis, anaphylaxis) and benefits of the procedure were reviewed in detail with Ms. Bailey and her family.  She expressed understanding and wishes to proceed today.  Will plan for a right or left radial approach.  Continue current medical therapy pending results of the procedure.     Jeremie Dias MD

## 2022-02-07 NOTE — SIGNIFICANT NOTE
02/07/22 1530   Wound 02/04/22 1716 sacral spine   Placement Date/Time: 02/04/22 1716   Location: sacral spine   Dressing Appearance intact; moist drainage   Base red; moist   Red (%), Wound Tissue Color 100   Periwound moist; macerated   Periwound Temperature warm   Periwound Skin Turgor soft   Edges open; jagged; irregular   Wound Length (cm) 3 cm   Wound Width (cm) 2 cm   Drainage Characteristics/Odor serosanguineous   Drainage Amount scant   Care, Wound cleansed with; sterile normal saline   Dressing Care dressing applied; border dressing; non-adherent; petroleum-based; silicone   Wound 02/04/22 1726 Left calf   Placement Date/Time: 02/04/22 1726   Side: Left  Location: calf   Dressing Appearance intact; no drainage   Base pink; scab; red; dry   Periwound intact   Edges rolled/closed   Drainage Amount none   Care, Wound cleansed with; sterile normal saline   Dressing Care dressing applied; non-adherent; abdominal pad; gauze; elastic bandage; petroleum-based   Wound 02/04/22 1731 leg    Placement Date/Time: 02/04/22 1731   Location: leg  Primary Wound Type: (c)    Dressing Appearance intact; no drainage   Base red; purple   Periwound intact; dry; pink   Edges rolled/closed   Wound Length (cm) 8 cm   Wound Width (cm) 6 cm   Drainage Amount none   Care, Wound cleansed with; sterile normal saline   Dressing Care dressing applied; abdominal pad; gauze; elastic bandage; non-adherent; petroleum-based   Wound 02/04/22 1733 Right calf   Placement Date/Time: 02/04/22 1733   Side: Right  Location: calf   Dressing Appearance intact; moist drainage   Base bleeding; red; purple; moist   Periwound redness; ecchymotic   Edges open   Drainage Characteristics/Odor serosanguineous   Drainage Amount small   Care, Wound cleansed with; sterile normal saline   Dressing Care dressing applied; abdominal pad; gauze; petroleum-based; non-adherent; elastic bandage   Wound 02/04/22 1737 Right lower leg   Placement Date/Time: 02/04/22  1737   Side: Right  Orientation: lower  Location: leg   Dressing Appearance intact; no drainage   Base red; scab; pink   Periwound dry; pink; redness   Periwound Temperature warm   Edges other (see comments)  (crusting)   Drainage Amount none   Care, Wound cleansed with; sterile normal saline   Dressing Care dressing applied; abdominal pad; gauze; elastic bandage; non-adherent; petroleum-based   Wound 02/04/22 1738 Right anterior   Placement Date/Time: 02/04/22 1738   Side: Right  Orientation: anterior   Dressing Appearance intact; no drainage   Closure Staples   Base dressing in place, unable to visualize  (stapled silicone over skin graft)   Periwound pink   Periwound Temperature warm   Edges   (not visible due to staples / dressing)   Drainage Amount none   Care, Wound cleansed with; sterile normal saline   Dressing Care dressing applied; elastic bandage; gauze; hydrofiber; silver impregnated   Wound Right anterior great toe Graft   No placement date or time found.   Present on Hospital Admission: Yes  Side: Right  Orientation: anterior  Location: great toe  Primary Wound Type: Graft  Additional Comments: Great toe amputated, area around toe and dorsal area of foot have skin graft.   Dressing Appearance dry; intact; no drainage   Closure Staples   Base other (see comments)  (dressing in place unable to visualize)   Periwound pink; redness   Edges other (see comments)  (not visible due to silicone dressing)   Drainage Amount none   Care, Wound cleansed with; sterile normal saline   Dressing Care dressing applied; hydrofiber; gauze; elastic bandage   Wound Consult: Patient currently admitted with NSTEMi. Hx of Afib, CHF, CAD, HLD, HTN. Patient within last two weeks had surgery to right foot with amputation. Call placed to [facility] [phone] and spoke with Dr. Noe. Discussed treatments for patient that have been done in past. Staples to remain intact at time as silicone over grafting  is still intact. Patient to follow-up once transferred back to closer facility or home. Will recommend treatments discussed.   Gabbi Castro RN

## 2022-02-08 VITALS
DIASTOLIC BLOOD PRESSURE: 51 MMHG | OXYGEN SATURATION: 98 % | SYSTOLIC BLOOD PRESSURE: 121 MMHG | HEIGHT: 63 IN | BODY MASS INDEX: 28.52 KG/M2 | HEART RATE: 81 BPM | TEMPERATURE: 98 F | WEIGHT: 160.94 LBS | RESPIRATION RATE: 16 BRPM

## 2022-02-08 LAB
ALBUMIN SERPL-MCNC: 2.9 G/DL (ref 3.5–5.2)
ANION GAP SERPL CALCULATED.3IONS-SCNC: 9.9 MMOL/L (ref 5–15)
APTT PPP: 40.6 SECONDS (ref 22.2–34.2)
BUN SERPL-MCNC: 13 MG/DL (ref 8–23)
BUN/CREAT SERPL: 19.4 (ref 7–25)
CALCIUM SPEC-SCNC: 9.6 MG/DL (ref 8.6–10.5)
CHLORIDE SERPL-SCNC: 101 MMOL/L (ref 98–107)
CO2 SERPL-SCNC: 28.1 MMOL/L (ref 22–29)
CREAT SERPL-MCNC: 0.67 MG/DL (ref 0.57–1)
DEPRECATED RDW RBC AUTO: 61.8 FL (ref 37–54)
ERYTHROCYTE [DISTWIDTH] IN BLOOD BY AUTOMATED COUNT: 17 % (ref 12.3–15.4)
GFR SERPL CREATININE-BSD FRML MDRD: 83 ML/MIN/1.73
GLUCOSE SERPL-MCNC: 115 MG/DL (ref 65–99)
HCT VFR BLD AUTO: 31.6 % (ref 34–46.6)
HGB BLD-MCNC: 9.8 G/DL (ref 12–15.9)
MAGNESIUM SERPL-MCNC: 2.1 MG/DL (ref 1.6–2.4)
MCH RBC QN AUTO: 30.5 PG (ref 26.6–33)
MCHC RBC AUTO-ENTMCNC: 31 G/DL (ref 31.5–35.7)
MCV RBC AUTO: 98.4 FL (ref 79–97)
PHOSPHATE SERPL-MCNC: 2.5 MG/DL (ref 2.5–4.5)
PLATELET # BLD AUTO: 174 10*3/MM3 (ref 140–450)
PMV BLD AUTO: 9.5 FL (ref 6–12)
POTASSIUM SERPL-SCNC: 3.2 MMOL/L (ref 3.5–5.2)
QT INTERVAL: 415 MS
RBC # BLD AUTO: 3.21 10*6/MM3 (ref 3.77–5.28)
SODIUM SERPL-SCNC: 139 MMOL/L (ref 136–145)
WBC NRBC COR # BLD: 5.33 10*3/MM3 (ref 3.4–10.8)

## 2022-02-08 PROCEDURE — B2151ZZ FLUOROSCOPY OF LEFT HEART USING LOW OSMOLAR CONTRAST: ICD-10-PCS | Performed by: INTERNAL MEDICINE

## 2022-02-08 PROCEDURE — 0 IOPAMIDOL PER 1 ML: Performed by: INTERNAL MEDICINE

## 2022-02-08 PROCEDURE — 85027 COMPLETE CBC AUTOMATED: CPT | Performed by: PHYSICIAN ASSISTANT

## 2022-02-08 PROCEDURE — C1894 INTRO/SHEATH, NON-LASER: HCPCS | Performed by: INTERNAL MEDICINE

## 2022-02-08 PROCEDURE — 93458 L HRT ARTERY/VENTRICLE ANGIO: CPT | Performed by: INTERNAL MEDICINE

## 2022-02-08 PROCEDURE — 4A023N7 MEASUREMENT OF CARDIAC SAMPLING AND PRESSURE, LEFT HEART, PERCUTANEOUS APPROACH: ICD-10-PCS | Performed by: INTERNAL MEDICINE

## 2022-02-08 PROCEDURE — 25010000002 MIDAZOLAM PER 1 MG: Performed by: INTERNAL MEDICINE

## 2022-02-08 PROCEDURE — 80069 RENAL FUNCTION PANEL: CPT | Performed by: PHYSICIAN ASSISTANT

## 2022-02-08 PROCEDURE — C1769 GUIDE WIRE: HCPCS | Performed by: INTERNAL MEDICINE

## 2022-02-08 PROCEDURE — 25010000002 FENTANYL CITRATE (PF) 50 MCG/ML SOLUTION: Performed by: INTERNAL MEDICINE

## 2022-02-08 PROCEDURE — 83735 ASSAY OF MAGNESIUM: CPT | Performed by: PHYSICIAN ASSISTANT

## 2022-02-08 PROCEDURE — B2111ZZ FLUOROSCOPY OF MULTIPLE CORONARY ARTERIES USING LOW OSMOLAR CONTRAST: ICD-10-PCS | Performed by: INTERNAL MEDICINE

## 2022-02-08 PROCEDURE — 99239 HOSP IP/OBS DSCHRG MGMT >30: CPT | Performed by: INTERNAL MEDICINE

## 2022-02-08 PROCEDURE — 85730 THROMBOPLASTIN TIME PARTIAL: CPT | Performed by: PHYSICIAN ASSISTANT

## 2022-02-08 RX ORDER — LIDOCAINE HYDROCHLORIDE 20 MG/ML
INJECTION, SOLUTION INFILTRATION; PERINEURAL AS NEEDED
Status: DISCONTINUED | OUTPATIENT
Start: 2022-02-08 | End: 2022-02-08 | Stop reason: HOSPADM

## 2022-02-08 RX ORDER — SODIUM CHLORIDE 9 MG/ML
50 INJECTION, SOLUTION INTRAVENOUS CONTINUOUS
Status: ACTIVE | OUTPATIENT
Start: 2022-02-08 | End: 2022-02-08

## 2022-02-08 RX ORDER — POTASSIUM CHLORIDE 750 MG/1
40 CAPSULE, EXTENDED RELEASE ORAL ONCE
Status: COMPLETED | OUTPATIENT
Start: 2022-02-08 | End: 2022-02-08

## 2022-02-08 RX ORDER — ONDANSETRON 2 MG/ML
4 INJECTION INTRAMUSCULAR; INTRAVENOUS EVERY 6 HOURS PRN
Status: DISCONTINUED | OUTPATIENT
Start: 2022-02-08 | End: 2022-02-08 | Stop reason: HOSPADM

## 2022-02-08 RX ORDER — FENTANYL CITRATE 50 UG/ML
INJECTION, SOLUTION INTRAMUSCULAR; INTRAVENOUS AS NEEDED
Status: DISCONTINUED | OUTPATIENT
Start: 2022-02-08 | End: 2022-02-08 | Stop reason: HOSPADM

## 2022-02-08 RX ORDER — MIDAZOLAM HYDROCHLORIDE 1 MG/ML
INJECTION INTRAMUSCULAR; INTRAVENOUS AS NEEDED
Status: DISCONTINUED | OUTPATIENT
Start: 2022-02-08 | End: 2022-02-08 | Stop reason: HOSPADM

## 2022-02-08 RX ORDER — ACETAMINOPHEN 325 MG/1
650 TABLET ORAL EVERY 4 HOURS PRN
Status: DISCONTINUED | OUTPATIENT
Start: 2022-02-08 | End: 2022-02-08 | Stop reason: HOSPADM

## 2022-02-08 RX ORDER — PRAVASTATIN SODIUM 40 MG
40 TABLET ORAL DAILY
Start: 2022-02-08

## 2022-02-08 RX ORDER — ONDANSETRON 4 MG/1
4 TABLET, FILM COATED ORAL EVERY 6 HOURS PRN
Status: DISCONTINUED | OUTPATIENT
Start: 2022-02-08 | End: 2022-02-08 | Stop reason: HOSPADM

## 2022-02-08 RX ADMIN — LISINOPRIL 2.5 MG: 2.5 TABLET ORAL at 10:21

## 2022-02-08 RX ADMIN — FUROSEMIDE 40 MG: 40 TABLET ORAL at 10:16

## 2022-02-08 RX ADMIN — SODIUM CHLORIDE 50 ML/HR: 9 INJECTION, SOLUTION INTRAVENOUS at 10:23

## 2022-02-08 RX ADMIN — WHITE PETROLATUM 1 APPLICATION: 1.75 OINTMENT TOPICAL at 10:22

## 2022-02-08 RX ADMIN — POTASSIUM CHLORIDE 40 MEQ: 750 CAPSULE, EXTENDED RELEASE ORAL at 07:59

## 2022-02-08 RX ADMIN — CARVEDILOL 25 MG: 25 TABLET, FILM COATED ORAL at 10:16

## 2022-02-08 RX ADMIN — Medication 1000 UNITS: at 10:16

## 2022-02-08 RX ADMIN — GABAPENTIN 300 MG: 300 CAPSULE ORAL at 10:16

## 2022-02-08 RX ADMIN — LEVOTHYROXINE SODIUM 125 MCG: 125 TABLET ORAL at 10:16

## 2022-02-08 NOTE — PLAN OF CARE
Goal Outcome Evaluation:  Plan of Care Reviewed With: patient, son        Progress: improving  Outcome Summary: Pt went to cath lab today, no interventions, will medically manage, appropriate for discharge.    Alyssia Rob RN

## 2022-02-08 NOTE — PLAN OF CARE
Goal Outcome Evaluation:      Pt rested well with no complaints. NPO after MN for cardiac cath. VSS MS RN

## 2022-02-08 NOTE — PLAN OF CARE
Goal Outcome Evaluation:    RD follow-up:  S/P cardiac cath this morning therefore was NPO since midnight.  Talked with pt & family member in room about importance of nutrition in wound healing.  Agreeable to trial of Anjel as previously ordered if it will help with wound healing, supplement will start on lunch tray today.

## 2022-02-08 NOTE — PRE-SEDATION DOCUMENTATION
Sedation Plan    ASA 3     Mallampati class: II.    Risks, benefits, and alternatives discussed with patient.    Jeremie Dias MD

## 2022-02-08 NOTE — DISCHARGE SUMMARY
[facility]         HOSPITALIST  DISCHARGE SUMMARY    Patient Name: Renetta Bailey  : 1935  MRN: [id number]    Date of Admission: 2022  Date of Discharge: 2022  Primary Care Physician: Joanne Mcdermott APRN  Reason for admission:  Chest pain    Final diagnosis:  Non-ST segment elevation MI  Chronic occlusive coronary artery disease noted on left heart catheterization 2022 not amenable to PCI   Chest pain secondary to above  Coronary artery disease with most recent cardiac catheterization done in  showing 100% occlusion of the LAD  Chronic atrial fibrillation on Xarelto  History of pacemaker placement  Combined systolic/diastolic congestive heart failure last known ejection fraction estimated 35 to 40% on echocardiogram 2021  Peripheral arterial disease  Hypertension  Hyperlipidemia    Consults     Date and Time Order Name Status Description    2022  5:00 PM Inpatient Cardiology Consult            Active and Resolved Hospital Problems:  Active Hospital Problems    Diagnosis POA   • NSTEMI (non-ST elevated myocardial infarction) ([facility]) [I21.4] Yes   • Atrial fibrillation ([facility]) [I48.91] Yes   • Congestive heart failure (CHF) ([facility]) [I50.9] Yes   • Coronary artery disease involving native coronary artery of native heart with unstable angina pectoris (HCC) [I25.110] Yes   • Hyperlipidemia [E78.5] Yes   • Primary hypertension [I10] Yes      Resolved Hospital Problems   No resolved problems to display.       Hospital Course     Hospital Course:  Renetta Bailey is a 87 y.o. female  with medical history significant for atrial fibrillation, congestive heart failure, CAD, hyperlipidemia, hypertension; [facility] patient evaluated there was noted to have elevated troponin patient's cardiologist Dr. Best was contacted and recommended transferring to our facility for further medical evaluation and treatment.  Son  at the bedside states that patient underwent an amputation of her right toe about a week ago.  Due to poor circulation, patient was supposed to have stents placed in her lower extremity.  Son reports that during procedure, patient started to complain of chest pain and procedure was stopped midway.  Patient was only able to have 1 stent placed out of the 3 that were needed.  Son reports that they were told that patient had a heart attack during the procedure.  Patient was then transferred to another facility and they discussed open heart surgery.  Patient was not agreeable to surgery.  Son reports that patient has been told in the past that she needed open heart surgery.  Son reports that patient underwent a cardiac cath many years ago.  Open heart surgery was recommended at that time, but they noted that patient did have collaterals and stated that patient would not be a good candidate for open heart.  Patient reports that she is not interested in having open heart surgery at the age of 86.   Patient transferred to our facility to a monitored bed on the PCU troponins trended Xarelto held started on heparin drip per ACS protocol wound team consulted to aid in wound care other home medications resumed and cardiology was consulted patient was taken to Cath Lab which showed chronic occlusive coronary artery disease not amenable  to PCI is recommended patient continue with goal-directed aggressive medical therapy for her coronary artery disease.  Postprocedural the patient is doing well she is without chest pains no shortness of air hemodynamics remained stable she has been cleared by cardiology will be discharged with orders to follow-up with her PCP in a week follow-up with her vascular surgeon/wound care specialist as previously directed given her extensive right lower extremity leg wound additionally patient will be asked to follow-up with her cardiologist as directed.    DISCHARGE Follow Up Recommendations for labs  and diagnostics: Follow-up PCP 1 week follow-up with vascular surgery/wound specialist ASAP follow-up with cardiology as directed      Day of Discharge     Vital Signs:  Temp:  [97.3 °F (36.3 °C)-98.1 °F (36.7 °C)] 98 °F (36.7 °C)  Heart Rate:  [79-87] 81  Resp:  [16-18] 16  BP: (101-150)/(41-73) 147/73  Flow (L/min):  [1-2] 2  Physical Exam:   Constitutional: Awake, alert, no acute distress              Eyes: Pupils equal, sclerae anicteric, no conjunctival injection              HENT: NCAT, mucous membranes moist              Neck: Supple, no thyromegaly, no lymphadenopathy, trachea midline              Respiratory: Clear to auscultation bilaterally, nonlabored respirations               Cardiovascular: RRR, no murmurs, rubs, or gallops, palpable pedal pulses bilaterally              Gastrointestinal: Positive bowel sounds, soft, nontender, nondistended              Musculoskeletal: No bilateral ankle edema, no clubbing or cyanosis to extremities              Psychiatric: Appropriate affect, cooperative              Neurologic: Oriented x 3, strength symmetric in all extremities, Cranial Nerves grossly intact to confrontation, speech clear              Skin: Extensive wound on right foot from previous right toe amputation large right calf hematoma/wound       Discharge Details        Discharge Medications      Changes to Medications      Instructions Start Date   pravastatin 40 MG tablet  Commonly known as: PRAVACHOL  What changed:   how much to take  how to take this   40 mg, Oral, Daily         Continue These Medications      Instructions Start Date   acetaminophen 500 MG tablet  Commonly known as: TYLENOL   500 mg, Oral, Every 6 Hours PRN      carvedilol 25 MG tablet  Commonly known as: COREG   25 mg, Oral, 2 Times Daily With Meals      cholecalciferol 25 MCG (1000 UT) tablet  Commonly known as: VITAMIN D3   1,000 Units, Oral, Daily      [employer] Vitamin B-12 1000 MCG tablet  Generic drug: cyanocobalamin   100 mcg,  Oral, Daily      furosemide 40 MG tablet  Commonly known as: LASIX   40 mg, Oral, 2 times daily      HYDROcodone-acetaminophen 5-325 MG per tablet  Commonly known as: NORCO   1 tablet, Oral, Every 6 Hours PRN      levothyroxine 125 MCG tablet  Commonly known as: SYNTHROID, LEVOTHROID   125 mcg, Oral, Daily      potassium chloride 20 MEQ CR tablet  Commonly known as: K-DUR,KLOR-CON   20 mEq, Oral, Daily      ramipril 5 MG capsule  Commonly known as: ALTACE   10 mg, Oral, Daily      rivaroxaban 15 MG tablet  Commonly known as: XARELTO   15 mg, Oral, Daily         Stop These Medications    gabapentin 300 MG capsule  Commonly known as: NEURONTIN            Allergies   Allergen Reactions   • Drug [Tramadol Hcl Er] Other (See Comments)     UNAROUSABLE   • Morphine Nausea Only and Other (See Comments)     Pt states it makes her feel weak and sick to her stomach          Discharge Disposition:  Home-Health Care [facility]    Diet:  Hospital:  Diet Order   Procedures   • Diet Regular; Cardiac       Discharge Activity: As tolerated      CODE STATUS:  Code Status and Medical Interventions:   Ordered at: 02/04/22 2232     Level Of Support Discussed With:    Patient     Code Status (Patient has no pulse and is not breathing):    CPR (Attempt to Resuscitate)     Medical Interventions (Patient has pulse or is breathing):    Full Support         Future Appointments   Date Time Provider Department Center   3/23/2022 10:30 AM Selina Quiroz APRN [facility]           Pertinent  and/or Most Recent Results     PROCEDURES:   Cardiac catheterization  LAB RESULTS:      Lab 02/08/22  0450 02/07/22  1417 02/07/22  1414 02/07/22  0823 02/07/22  0308 02/06/22 2012 02/06/22  1603 02/06/22  1147 02/06/22  0450 02/06/22  0450 02/05/22  1536 02/05/22  0431 02/04/22  1802 02/04/22  1802   WBC 5.33  --   --  4.89 5.16  --   --   --   --  4.31  --  5.40   < > 5.89   HEMOGLOBIN 9.8* 9.4*  --  9.4*  9.3* 9.1*  --  9.1*  --    < > 9.2*   < >  9.0*   < > 9.8*   HEMATOCRIT 31.6* 29.2*  --  29.5*  28.5* 28.8*  --  27.2*  --    < > 28.2*   < > 27.6*   < > 30.7*   PLATELETS 174  --   --  182 163  --   --   --   --  161  --  158   < > 176   NEUTROS ABS  --   --   --  3.45  --   --   --   --   --   --   --  4.70  --  4.83   IMMATURE GRANS (ABS)  --   --   --  0.03  --   --   --   --   --   --   --   --   --  0.09*   LYMPHS ABS  --   --   --  0.81  --   --   --   --   --   --   --   --   --  0.52*   MONOS ABS  --   --   --  0.45  --   --   --   --   --   --   --   --   --  0.34   EOS ABS  --   --   --  0.12  --   --   --   --   --   --   --  0.05  --  0.09   MCV 98.4*  --   --  98.7* 99.3*  --   --   --   --  94.6  --  95.5   < > 98.1*   PROTIME  --   --  10.9  --   --   --   --  10.9  --   --   --   --   --   --    APTT 40.6*  --   --  40.7* 44.3* 38.9*  --  27.7  --   --   --   --   --   --     < > = values in this interval not displayed.         Lab 02/08/22  0450 02/07/22  1414 02/07/22  0308 02/06/22  0506 02/06/22  0450 02/05/22  0431   SODIUM 139 140 139  --  138 139   POTASSIUM 3.2* 3.4* 3.8  --  3.6 3.4*   CHLORIDE 101 100 100  --  101 104   CO2 28.1 30.8* 30.4*  --  27.8 26.9   ANION GAP 9.9 9.2 8.6  --  9.2 8.1   BUN 13 11 13  --  13 9   CREATININE 0.67 0.69 0.85  --  0.79 0.80   GLUCOSE 115* 98 139*  --  123* 96   CALCIUM 9.6 9.7 9.6  --  9.5 9.5   MAGNESIUM 2.1  --  2.0  --  1.7 1.7   PHOSPHORUS 2.5  --  2.7  --  2.4*  --    HEMOGLOBIN A1C  --   --   --  5.10  --   --          Lab 02/08/22  0450 02/07/22  0308 02/06/22  0450 02/04/22  1802   TOTAL PROTEIN  --   --   --  5.8*   ALBUMIN 2.90* 3.20* 3.10* 3.50   GLOBULIN  --   --   --  2.3   ALT (SGPT)  --   --   --  7   AST (SGOT)  --   --   --  12   BILIRUBIN  --   --   --  0.8   ALK PHOS  --   --   --  94         Lab 02/07/22  1414 02/06/22  1147 02/05/22  1536 02/04/22  2318 02/04/22  1802   TROPONIN T  --   --  0.160* 0.181* 0.184*   PROTIME 10.9 10.9  --   --   --    INR 1.05* 1.05*  --   --    --          Lab 02/06/22  0450   CHOLESTEROL 122   LDL CHOL 68   HDL CHOL 34*   TRIGLYCERIDES 106         Lab 02/06/22  0450   IRON 28*   IRON SATURATION 13*   TIBC 218*   TRANSFERRIN 146*   FOLATE 6.33   VITAMIN B 12 1,284*         Brief Urine Lab Results  (Last result in the past 365 days)      Color   Clarity   Blood   Leuk Est   Nitrite   Protein   CREAT   Urine HCG        02/05/22 1240 Yellow   Clear   Moderate (2+)   Moderate (2+)   Negative   Negative               Microbiology Results (last 10 days)     Procedure Component Value - Date/Time    Urine Culture - Urine, Urine, Clean Catch [[id number]]  (Normal) Collected: 02/05/22 1240    Lab Status: Final result Specimen: Urine, Clean Catch Updated: 02/07/22 0905     Urine Culture No growth                           Labs Pending at Discharge:        Time spent on Discharge including face to face service: Greater than 30 minutes    Electronically signed by SHARON Vigil, 02/08/22, 12:28 PM EST.    Attending Note:  I have seen and examined the patient on the day of discharge and agree with above per Mr. Analy PA-C.  Very pleasant 87-year-old female who presented as a direct admission from an outside hospital for NSTEMI.  She has chronic occlusive coronary disease, underwent cardiac cath, medical management recommended per cardiology, no intervention pursued.  Additionally she needs close follow-up with her vascular surgeon for recent toe amputation and skin grafting.  Wound care nurse did speak with vascular surgeon to confirm's recommendations for continued wound care.  She is stable for discharge home today, discussed with Dr. Dias the cardiologist.  Continue medical management.  Patient to followup with primary care provider.    Condition at dc: stable for dc    Electronically signed by Ralf Bellamy MD, 02/08/22, 2:20 PM EST.

## 2022-02-08 NOTE — CASE MANAGEMENT/SOCIAL WORK
Patient went to Cath lab on 2/7/22.   Patient currently on Xarelto- at times can cost $500 when in [facility]. Patient lives on very limited income-  provided patient family with [employer] information to file for patient assistance.  Patient will d/c home with family. Home Health with [email or web address].

## 2022-02-09 ENCOUNTER — READMISSION MANAGEMENT (OUTPATIENT)
Dept: CALL CENTER | Facility: HOSPITAL | Age: 87
End: 2022-02-09

## 2022-02-09 NOTE — OUTREACH NOTE
Prep Survey      Responses   [facility] patient discharged from? [facility]   Is LACE score < 7 ? No   Emergency Room discharge w/ pulse ox? No   Eligibility Readm Mgmt   Discharge diagnosis NSTEMI, s/p heart cath   Does the patient have one of the following disease processes/diagnoses(primary or secondary)? Acute MI (STEMI,NSTEMI)   Does the patient have Home health ordered? Yes   What is the Home health agency?  [facility]   Is there a DME ordered? No   Prep survey completed? Yes          Jaida Loja RN

## 2022-02-10 ENCOUNTER — READMISSION MANAGEMENT (OUTPATIENT)
Dept: CALL CENTER | Facility: HOSPITAL | Age: 87
End: 2022-02-10

## 2022-02-10 NOTE — OUTREACH NOTE
AMI Week 1 Survey      Responses   [facility] patient discharged from? [facility]   Does the patient have one of the following disease processes/diagnoses(primary or secondary)? Acute MI (STEMI,NSTEMI)   Week 1 attempt successful? Yes   Call start time 1636   Call end time 1641   Discharge diagnosis NSTEMI, s/p heart cath   Person spoke with today (if not patient) and relationship daughter, Milagros Martino   Meds reviewed with patient/caregiver? Yes   Is the patient having any side effects they believe may be caused by any medication additions or changes? No   Does the patient have all prescriptions related to this admission filled (includes statins,anticoagulants,HTN meds,anti-arrhythmia meds) N/A   Is the patient taking all medications as directed (includes completed medication regime)? Yes   Does the patient have a primary care provider?  Yes   Does the patient have an appointment with their PCP,cardiologist,or clinic within 7 days of discharge? Yes   Has the patient kept scheduled appointments due by today? N/A   What is the Home health agency?  [facility]   Has home health visited the patient within 72 hours of discharge? Yes   Psychosocial issues? No   Did the patient receive a copy of their discharge instructions? Yes   Nursing interventions Reviewed instructions with patient   What is the patient's perception of their health status since discharge? Improving   Nursing interventions Nurse provided patient education   Is the patient/caregiver able to teach back signs and symptoms of when to call for help immediately: Sudden chest discomfort,  Shortness of breath at any time,  Nausea or vomiting,  Irregular or rapid heart rate,  Sudden discomfort in arms, back, neck or jaw,  Sudden sweating or clammy skin,  Dizziness or lightheadedness   Nursing interventions Nurse provided patient education   Is the pateint /caregiver able to teach back the importance of cardiac rehab? Yes   Nursing interventions  Provided education on importance of cardiac rehab   Is the patient/caregiver able to teach back lifestyle changes to help prevent MIs Heart healthy diet,  Regular exercise as approved by provider,  Maintaining a healthy weight,  Reducing stress   Is the patient/caregiver able to teach back ways to prevent a second heart attack: Take medications,  Follow up with MD,  Participate in Cardiac Rehab,  Manage risk factors   If the patient is a current smoker, are they able to teach back resources for cessation? Not a smoker   Is the patient/caregiver able to teach back the hierarchy of who to call/visit for symptoms/problems? PCP, Specialist, Home health nurse, Urgent Care, ED, 911 Yes   Additional teach back comments daughter states pt feels much improved since hospital stay, free of chest pain, SOB   Week 1 call completed? Yes          Richelle Hernandes RN

## 2022-02-17 ENCOUNTER — READMISSION MANAGEMENT (OUTPATIENT)
Dept: CALL CENTER | Facility: HOSPITAL | Age: 87
End: 2022-02-17
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
## 2022-02-25 ENCOUNTER — READMISSION MANAGEMENT (OUTPATIENT)
Dept: CALL CENTER | Facility: HOSPITAL | Age: 87
End: 2022-02-25

## 2022-02-25 NOTE — OUTREACH NOTE
AMI Week 3 Survey      Responses   [facility] patient discharged from? [facility]   Does the patient have one of the following disease processes/diagnoses(primary or secondary)? Acute MI (STEMI,NSTEMI)   Week 3 attempt successful? Yes   Call start time 1242   Call end time 1247   Discharge diagnosis NSTEMI, s/p heart cath   Is patient permission given to speak with other caregiver? Yes   List who call center can speak with daughter Milagros   Person spoke with today (if not patient) and relationship patient   Meds reviewed with patient/caregiver? Yes   Is the patient taking all medications as directed (includes completed medication regime)? Yes   Does the patient have a primary care provider?  Yes   Has the patient kept scheduled appointments due by today? Yes   What is the Home health agency?  [facility] continued   Psychosocial issues? No   What is the patient's perception of their health status since discharge? Improving   Is the patient/caregiver able to teach back signs and symptoms of when to call for help immediately: Sudden chest discomfort,  Shortness of breath at any time   Is the patient/caregiver able to teach back ways to prevent a second heart attack: Take medications,  Follow up with MD   If the patient is a current smoker, are they able to teach back resources for cessation? Not a smoker   Is the patient/caregiver able to teach back the hierarchy of who to call/visit for symptoms/problems? PCP, Specialist, Home health nurse, Urgent Care, ED, 911 Yes   Week 3 call completed? Yes          April Rutledge RN

## 2022-03-03 ENCOUNTER — TELEPHONE (OUTPATIENT)
Dept: CARDIOLOGY | Facility: CLINIC | Age: 87
End: 2022-03-03

## 2022-03-03 NOTE — TELEPHONE ENCOUNTER
Procedure: bilateral lower leg hematoma evacuation under local/local mac anesthesia     Med Directive: NA    PMH:  NSTEMI 2/4, afib, CHF, CAD, hyperlipidemia, HTN     Last Seen: 9/21/21    Heart cath 2/8 Per Dr Dias         Conclusions:  1. Severe multivessel coronary artery disease, as detailed above, including chronic total occlusions of the mid LAD and proximal RCA (with bridging collaterals observed to both the distal LAD and distal RCA).    2. Moderately reduced left ventricular systolic function with an estimated ejection fraction of 35% and moderate global hypokinesis with apical akinesis and basal-mid inferior akinesis. Mild-moderate mitral regurgitation was observed.  3. Normal LVEDP.

## 2022-03-03 NOTE — TELEPHONE ENCOUNTER
Based on the patient history and planned procedure the patient is moderate to high cardiovascular risk for anesthesia  This is baseline risk as the patient has known multivessel coronary artery disease but no targets for revascularization on recent catheterization and is on medical management    The patient is typically on anticoagulation for atrial fibrillation, this could be held for 48 hours or so before the procedure if she still remains on anticoagulation at this point    No additional cardiac testing is needed    Abel Best MD

## 2022-03-08 ENCOUNTER — READMISSION MANAGEMENT (OUTPATIENT)
Dept: CALL CENTER | Facility: HOSPITAL | Age: 87
End: 2022-03-08

## 2022-03-08 NOTE — OUTREACH NOTE
AMI Week 4 Survey    Flowsheet Row Responses   [facility] patient discharged from? [facility]   Does the patient have one of the following disease processes/diagnoses(primary or secondary)? Acute MI (STEMI,NSTEMI)   Week 4 attempt successful? Yes   Call start time 0908   Call end time 0911   Discharge diagnosis NSTEMI, s/p heart cath   Meds reviewed with patient/caregiver? Yes   Is the patient having any side effects they believe may be caused by any medication additions or changes? No   Is the patient taking all medications as directed (includes completed medication regime)? Yes   Has the patient kept scheduled appointments due by today? Yes   Psychosocial issues? No   What is the patient's perception of their health status since discharge? Improving   Nursing interventions Nurse provided patient education   Is the patient/caregiver able to teach back signs and symptoms of when to call for help immediately: Sudden chest discomfort, Shortness of breath at any time, Dizziness or lightheadedness   Nursing interventions Nurse provided patient education   Is the pateint /caregiver able to teach back the importance of cardiac rehab? Yes   Nursing interventions Provided education on importance of cardiac rehab   Is the patient/caregiver able to teach back lifestyle changes to help prevent MIs Heart healthy diet, Regular exercise as approved by provider, Maintaining a healthy weight, Reducing stress   Is the patient/caregiver able to teach back ways to prevent a second heart attack: Take medications, Follow up with MD   If the patient is a current smoker, are they able to teach back resources for cessation? Not a smoker   Is the patient/caregiver able to teach back the hierarchy of who to call/visit for symptoms/problems? PCP, Specialist, Home health nurse, Urgent Care, ED, 911 Yes   Week 4 call completed? Yes   Would the patient like one additional call? No   Graduated Yes   Is the patient interested in additional calls  from an ambulatory ?  NOTE:  applies to high risk patients requiring additional follow-up. No   Did the patient feel the follow up calls were helpful during their recovery period? Yes   Was the number of calls appropriate? Yes   Wrap up additional comments Per son she is doing well. No issues or concerns          AYANA GUZMAN - Registered Nurse

## 2022-03-23 ENCOUNTER — OFFICE VISIT (OUTPATIENT)
Dept: CARDIOLOGY | Facility: CLINIC | Age: 87
End: 2022-03-23

## 2022-03-23 VITALS
DIASTOLIC BLOOD PRESSURE: 59 MMHG | WEIGHT: 150 LBS | HEIGHT: 63 IN | HEART RATE: 80 BPM | SYSTOLIC BLOOD PRESSURE: 127 MMHG | BODY MASS INDEX: 26.58 KG/M2

## 2022-03-23 DIAGNOSIS — I10 ESSENTIAL HYPERTENSION: ICD-10-CM

## 2022-03-23 DIAGNOSIS — I48.21 PERMANENT ATRIAL FIBRILLATION: ICD-10-CM

## 2022-03-23 DIAGNOSIS — I25.118 CORONARY ARTERY DISEASE OF NATIVE ARTERY OF NATIVE HEART WITH STABLE ANGINA PECTORIS: Primary | ICD-10-CM

## 2022-03-23 DIAGNOSIS — I44.2 AV BLOCK, COMPLETE: ICD-10-CM

## 2022-03-23 DIAGNOSIS — I25.5 ISCHEMIC CARDIOMYOPATHY: ICD-10-CM

## 2022-03-23 PROCEDURE — 99214 OFFICE O/P EST MOD 30 MIN: CPT | Performed by: NURSE PRACTITIONER

## 2022-03-23 NOTE — PROGRESS NOTES
Chief Complaint  Atrial Fibrillation (6 month follow up)    Subjective            Renetta Bailey presents to [facility] CARDIOLOGY  History of Present Illness    Renetta is a 87-year-old white/ female here today for follow-up evaluation and management of chronic atrial fibrillation, ischemic cardiomyopathy, AV block with permanent pacemaker, coronary artery disease, hypertension, and peripheral artery disease.  Cameron was hospitalized with N-STEMI last month, she underwent heart catheterization which revealed severe multivessel coronary artery disease including chronic total occlusions of the mid LAD and proximal RCA with bridging collaterals, no targets for revascularization.  The study also showed moderately reduced left ventricular systolic function, EF 35% with moderate global hypokinesis, also mild/moderate mitral regurgitation.  Today, she reports that she is feeling well, back to baseline.  She denies any significant chest pain, shortness of breath, palpitations, or dizziness.  She is compliant with her medications.    PMH  Past Medical History:   Diagnosis Date   • Atrial fibrillation (HCC)    • CHF (congestive heart failure) (HCC)    • Coronary artery disease    • Hyperlipidemia    • Hypertension    • Myocardial infarction (HCC)          SURGICALHX  Past Surgical History:   Procedure Laterality Date   • CARDIAC CATHETERIZATION Right 2/8/2022    Procedure: Left Heart Cath;  Surgeon: Jeremie Dias MD;  Location: [facility] CATH INVASIVE LOCATION;  Service: Cardiovascular;  Laterality: Right;   • HIP SURGERY     • HYSTERECTOMY     • INSERT / REPLACE / REMOVE PACEMAKER     • TONSILLECTOMY          SOC  Social History     Socioeconomic History   • Marital status:    Tobacco Use   • Smoking status: Never Smoker   • Smokeless tobacco: Never Used   Vaping Use   • Vaping Use: Never used   Substance and Sexual Activity   • Alcohol use: Never   • Drug use: Never   • Sexual  "activity: Defer         FAMHX  Family History   Problem Relation Age of Onset   • Heart attack Mother    • No Known Problems Father           ALLERGY  Allergies   Allergen Reactions   • Drug [Tramadol Hcl Er] Other (See Comments)     UNAROUSABLE   • Morphine Nausea Only and Other (See Comments)     Pt states it makes her feel weak and sick to her stomach           MEDSCURRENT    Current Outpatient Medications:   •  acetaminophen (TYLENOL) 500 MG tablet, Take 500 mg by mouth Every 6 (Six) Hours As Needed for Mild Pain ., Disp: , Rfl:   •  carvedilol (COREG) 25 MG tablet, Take 25 mg by mouth 2 (Two) Times a Day With Meals., Disp: , Rfl:   •  cholecalciferol (VITAMIN D3) 25 MCG (1000 UT) tablet, Take 1,000 Units by mouth Daily., Disp: , Rfl:   •  cyanocobalamin (VITAMIN B-12) 1000 MCG tablet, Take 100 mcg by mouth Daily., Disp: , Rfl:   •  furosemide (LASIX) 40 MG tablet, Take 40 mg by mouth 2 (two) times a day., Disp: , Rfl:   •  HYDROcodone-acetaminophen (NORCO) 5-325 MG per tablet, Take 1 tablet by mouth Every 6 (Six) Hours As Needed., Disp: , Rfl:   •  levothyroxine (SYNTHROID, LEVOTHROID) 125 MCG tablet, Take 125 mcg by mouth Daily., Disp: , Rfl:   •  potassium chloride (K-DUR,KLOR-CON) 20 MEQ CR tablet, Take 20 mEq by mouth Daily., Disp: , Rfl:   •  pravastatin (PRAVACHOL) 40 MG tablet, Take 1 tablet by mouth Daily., Disp: , Rfl:   •  ramipril (ALTACE) 5 MG capsule, Take 10 mg by mouth Daily., Disp: , Rfl:   •  rivaroxaban (XARELTO) 15 MG tablet, Take 1 tablet by mouth Daily., Disp: 30 tablet, Rfl: 11      Review of Systems   Constitutional: Negative for malaise/fatigue.   Cardiovascular: Positive for leg swelling. Negative for chest pain, dyspnea on exertion, near-syncope, orthopnea and palpitations.   Skin: Positive for color change and poor wound healing.   Neurological: Negative for dizziness and light-headedness.        Objective     /59   Pulse 80   Ht 160 cm (63\")   Wt 68 kg (150 lb)   BMI 26.57 " kg/m²       General Appearance:   · well developed  · well nourished  HENT:   · oropharynx moist  · lips not cyanotic  Neck:  · thyroid not enlarged  · supple  Respiratory:  · no respiratory distress  · normal breath sounds  · no rales  Cardiovascular:  · no jugular venous distention  · Irregularly irregular  · apical impulse normal  · S1 normal, S2 normal  · no S3, no S4   · no murmur  · no rub, no thrill  · carotid pulses normal; no bruit  · pedal pulses normal  · lower extremity edema: 1+  Musculoskeletal:  · no clubbing of fingers.   · normocephalic, head atraumatic  Skin:   · warm, dry  Psychiatric:  · judgement and insight appropriate  · normal mood and affect      Result Review :         CBC    CBC 2/6/22 2/6/22 2/7/22 2/7/22 2/7/22 2/7/22 2/8/22    0450 1603 0308 0823 0823 1417    WBC 4.31  5.16  4.89  5.33   RBC 2.98 (A)  2.90 (A)  2.99 (A)  3.21 (A)   Hemoglobin 9.2 (A) 9.1 (A) 9.1 (A) 9.3 (A) 9.4 (A) 9.4 (A) 9.8 (A)   Hematocrit 28.2 (A) 27.2 (A) 28.8 (A) 28.5 (A) 29.5 (A) 29.2 (A) 31.6 (A)   MCV 94.6  99.3 (A)  98.7 (A)  98.4 (A)   MCH 30.9  31.4  31.4  30.5   MCHC 32.6  31.6  31.9  31.0 (A)   RDW 17.8 (A)  17.4 (A)  17.5 (A)  17.0 (A)   Platelets 161  163  182  174   (A) Abnormal value            Lipid Panel    Lipid Panel 2/6/22   Total Cholesterol 122   Triglycerides 106   HDL Cholesterol 34 (A)   VLDL Cholesterol 20   LDL Cholesterol  68   LDL/HDL Ratio 1.96   (A) Abnormal value            BMP    BMP 2/6/22 2/7/22 2/7/22 2/8/22     0308 1414    BUN 13 13 11 13   Creatinine 0.79 0.85 0.69 0.67   Sodium 138 139 140 139   Potassium 3.6 3.8 3.4 (A) 3.2 (A)   Chloride 101 100 100 101   CO2 27.8 30.4 (A) 30.8 (A) 28.1   Calcium 9.5 9.6 9.7 9.6   (A) Abnormal value              Data reviewed: Cardiology studies Hospitalization notes reviewed, cardiac catheterization reviewed as above.  Laboratory studies reviewed.  Echocardiogram from 2021 showing EF 35 to 40%.     Procedures      Renetta Bailey   reports that she has never smoked. She has never used smokeless tobacco.. I have educated her on the risk of diseases from using tobacco products such as cancer, COPD and heart disease.            Assessment and Plan        ASSESSMENT:  Encounter Diagnoses   Name Primary?   • Coronary artery disease of native artery of native heart with stable angina pectoris (HCC) Yes   • Ischemic cardiomyopathy    • AV block, complete (HCC)    • Essential hypertension    • Permanent atrial fibrillation (HCC)          PLAN:    1.  Coronary artery disease-recent non-STEMI.  Heart cath showed severe multivessel CAD.  Given Ms. Bailey's advanced age, comorbidities, and no significant angina with stable LV function, will continue with optical medical therapy.    2.  Ischemic cardiomyopathy-clinically stable, fluid status stable.  Continue with daily weights and low-sodium diet.  Continue ACE inhibitor, beta-blocker, and diuretic.  3.  AV block-remote pacemaker monitoring shows normal device function, 100% battery life, 47% RV pacing.  4.  Essential hypertension-controlled.  5.  Permanent atrial fibrillation-clinically stable, continue Xarelto for embolic stroke prevention.          Patient was given instructions and counseling regarding her condition or for health maintenance advice. Please see specific information pulled into the AVS if appropriate.           Selina Quiroz, APRN   3/23/2022  11:49 EDT

## 2022-07-20 ENCOUNTER — OFFICE VISIT (OUTPATIENT)
Dept: CARDIOLOGY | Facility: CLINIC | Age: 87
End: 2022-07-20

## 2022-07-20 VITALS
WEIGHT: 150 LBS | HEART RATE: 79 BPM | BODY MASS INDEX: 24.11 KG/M2 | HEIGHT: 66 IN | DIASTOLIC BLOOD PRESSURE: 42 MMHG | SYSTOLIC BLOOD PRESSURE: 100 MMHG

## 2022-07-20 DIAGNOSIS — R60.0 LOWER EXTREMITY EDEMA: ICD-10-CM

## 2022-07-20 DIAGNOSIS — I25.10 CORONARY ARTERY DISEASE INVOLVING NATIVE CORONARY ARTERY OF NATIVE HEART WITHOUT ANGINA PECTORIS: Primary | ICD-10-CM

## 2022-07-20 DIAGNOSIS — I10 ESSENTIAL HYPERTENSION: ICD-10-CM

## 2022-07-20 DIAGNOSIS — I48.21 PERMANENT ATRIAL FIBRILLATION: ICD-10-CM

## 2022-07-20 DIAGNOSIS — I44.2 AV BLOCK, COMPLETE: ICD-10-CM

## 2022-07-20 PROCEDURE — 99214 OFFICE O/P EST MOD 30 MIN: CPT | Performed by: NURSE PRACTITIONER

## 2022-07-20 RX ORDER — METOLAZONE 2.5 MG/1
2.5 TABLET ORAL 2 TIMES WEEKLY
Qty: 28 TABLET | Refills: 2 | Status: SHIPPED | OUTPATIENT
Start: 2022-07-21 | End: 2022-12-19

## 2022-07-20 NOTE — PROGRESS NOTES
Chief Complaint  Coronary Artery Disease and Leg Swelling    Subjective            Renetta Bailey presents to [facility] CARDIOLOGY  History of Present Illness     is here today for follow-up evaluation management of chronic atrial fibrillation, ischemic cardiomyopathy, AV block with permanent pacemaker, coronary artery disease, hypertension, and PAD.  In February she underwent a heart catheterization after being hospitalized with non-STEMI, this showed severe multivessel CAD including  of the mid LAD and proximal RCA with bridging collaterals, there were no targets for revascularization.  EF 35%. Today she is here due to complaints of increased lower extremity swelling.  She denies any chest pain or shortness of breath.  Her Lasix was just increased a couple of days ago to 40 mg twice daily.  She follows with [place] wound care.    [facility]  Past Medical History:   Diagnosis Date   • Atrial fibrillation (HCC)    • CHF (congestive heart failure) ([facility])    • Coronary artery disease    • Hyperlipidemia    • Hypertension    • Myocardial infarction (HCC)          SURGICALHX  Past Surgical History:   Procedure Laterality Date   • CARDIAC CATHETERIZATION Right 2/8/2022    Procedure: Left Heart Cath;  Surgeon: Jeremie Dias MD;  Location: [facility] CATH INVASIVE LOCATION;  Service: Cardiovascular;  Laterality: Right;   • HIP SURGERY     • HYSTERECTOMY     • INSERT / REPLACE / REMOVE PACEMAKER     • TONSILLECTOMY          SOC  Social History     Socioeconomic History   • Marital status:    Tobacco Use   • Smoking status: Never Smoker   • Smokeless tobacco: Never Used   Vaping Use   • Vaping Use: Never used   Substance and Sexual Activity   • Alcohol use: Never   • Drug use: Never   • Sexual activity: Defer         FAMHX  Family History   Problem Relation Age of Onset   • Heart attack Mother    • No Known Problems Father           ALLERGY  Allergies   Allergen Reactions   • Drug  "[Tramadol Hcl Er] Other (See Comments)     UNAROUSABLE   • Morphine Nausea Only and Other (See Comments)     Pt states it makes her feel weak and sick to her stomach           MEDSCURRENT    Current Outpatient Medications:   •  acetaminophen (TYLENOL) 500 MG tablet, Take 500 mg by mouth Every 6 (Six) Hours As Needed for Mild Pain ., Disp: , Rfl:   •  cholecalciferol (VITAMIN D3) 25 MCG (1000 UT) tablet, Take 1,000 Units by mouth Daily., Disp: , Rfl:   •  cyanocobalamin (VITAMIN B-12) 1000 MCG tablet, Take 100 mcg by mouth Daily., Disp: , Rfl:   •  furosemide (LASIX) 40 MG tablet, Take 40 mg by mouth 2 (two) times a day., Disp: , Rfl:   •  HYDROcodone-acetaminophen (NORCO) 5-325 MG per tablet, Take 1 tablet by mouth Every 6 (Six) Hours As Needed., Disp: , Rfl:   •  levothyroxine (SYNTHROID, LEVOTHROID) 125 MCG tablet, Take 125 mcg by mouth Daily., Disp: , Rfl:   •  potassium chloride (K-DUR,KLOR-CON) 20 MEQ CR tablet, Take 20 mEq by mouth Daily., Disp: , Rfl:   •  pravastatin (PRAVACHOL) 40 MG tablet, Take 1 tablet by mouth Daily., Disp: , Rfl:   •  ramipril (ALTACE) 5 MG capsule, Take 10 mg by mouth Daily., Disp: , Rfl:   •  rivaroxaban (XARELTO) 15 MG tablet, Take 1 tablet by mouth Daily., Disp: 30 tablet, Rfl: 3  •  carvedilol (COREG) 25 MG tablet, Take 25 mg by mouth 2 (Two) Times a Day With Meals., Disp: , Rfl:   •  metOLazone (ZAROXOLYN) 2.5 MG tablet, Take 1 tablet by mouth 2 (Two) Times a Week., Disp: 28 tablet, Rfl: 2      Review of Systems   Constitutional: Negative for malaise/fatigue.   Cardiovascular: Positive for leg swelling. Negative for chest pain, dyspnea on exertion, irregular heartbeat, near-syncope, orthopnea and palpitations.   Respiratory: Negative for shortness of breath.    Neurological: Positive for loss of balance.        Objective     /42   Pulse 79   Ht 167.6 cm (66\")   Wt 68 kg (150 lb)   BMI 24.21 kg/m²       General Appearance:   · well developed  · well nourished  HENT: "   · oropharynx moist  · lips not cyanotic  Neck:  · thyroid not enlarged  · supple  Respiratory:  · no respiratory distress  · normal breath sounds  · no rales  Cardiovascular:  · no jugular venous distention  · Irregularly irregular  · apical impulse normal  · S1 normal, S2 normal  · no S3, no S4   · no murmur  · no rub, no thrill  · carotid pulses normal; no bruit  · pedal pulses normal  · lower extremity edema: 3+ pitting below knee  Musculoskeletal:  · no clubbing of fingers.   · normocephalic, head atraumatic  Skin:   · warm, dry  Psychiatric:  · judgement and insight appropriate  · normal mood and affect      Result Review :         CBC    CBC 2/6/22 2/6/22 2/7/22 2/7/22 2/7/22 2/7/22 2/8/22    0450 1603 0308 0823 0823 1417    WBC 4.31  5.16  4.89  5.33   RBC 2.98 (A)  2.90 (A)  2.99 (A)  3.21 (A)   Hemoglobin 9.2 (A) 9.1 (A) 9.1 (A) 9.3 (A) 9.4 (A) 9.4 (A) 9.8 (A)   Hematocrit 28.2 (A) 27.2 (A) 28.8 (A) 28.5 (A) 29.5 (A) 29.2 (A) 31.6 (A)   MCV 94.6  99.3 (A)  98.7 (A)  98.4 (A)   MCH 30.9  31.4  31.4  30.5   MCHC 32.6  31.6  31.9  31.0 (A)   RDW 17.8 (A)  17.4 (A)  17.5 (A)  17.0 (A)   Platelets 161  163  182  174   (A) Abnormal value            Lipid Panel    Lipid Panel 2/6/22   Total Cholesterol 122   Triglycerides 106   HDL Cholesterol 34 (A)   VLDL Cholesterol 20   LDL Cholesterol  68   LDL/HDL Ratio 1.96   (A) Abnormal value            BMP    BMP 2/6/22 2/7/22 2/7/22 2/8/22     0308 1414    BUN 13 13 11 13   Creatinine 0.79 0.85 0.69 0.67   Sodium 138 139 140 139   Potassium 3.6 3.8 3.4 (A) 3.2 (A)   Chloride 101 100 100 101   CO2 27.8 30.4 (A) 30.8 (A) 28.1   Calcium 9.5 9.6 9.7 9.6   (A) Abnormal value              Data reviewed: Cardiology studies Cardiac catheterization notes reviewed as above.     Procedures      Renetta Bailey  reports that she has never smoked. She has never used smokeless tobacco.. I have educated her on the risk of diseases from using tobacco products such as cancer,  COPD and heart disease.                     Assessment and Plan        ASSESSMENT:  Encounter Diagnoses   Name Primary?   • Lower extremity edema    • Coronary artery disease involving native coronary artery of native heart without angina pectoris Yes   • AV block, complete (HCC)    • Essential hypertension    • Permanent atrial fibrillation (HCC)          PLAN:    1.  Coronary artery disease-clinically stable, no angina.  Recent cardiac catheterization findings as above.  Given Ms. Gomez advanced age, her comorbidities, with no significant angina we will continue to maximize medical therapy.  2.  Ischemic cardiomyopathy-significant swelling of the lower extremities, no significant dyspnea.  She just started increased Lasix dose a few days ago, I have instructed her to continue taking 40 mg twice daily.  We will also add Zaroxolyn to be taken twice a week until there is improvement in her swelling, we will check a BMP in a week.  Also instructed her to follow a very low sodium diet and to elevate extremities when at rest.  Also encouraged her to get a scale for home to monitor her weights.  3.  Complete AV block-permanent A. fib , remote pacemaker monitoring shows normal device function, it has been about a year since an in person device check, we will get this scheduled for her next follow-up.  Continue anticoagulation with Xarelto.  4.  Hypertension-controlled, continue current medical therapy.    Ms. Bailey is agreeable to the plan of care, she will follow-up in a few weeks for reevaluation.            Patient was given instructions and counseling regarding her condition or for health maintenance advice. Please see specific information pulled into the AVS if appropriate.           Selina Quiroz, WILFRID   8/1/2022  11:08 EDT

## 2022-08-04 ENCOUNTER — TELEPHONE (OUTPATIENT)
Dept: CARDIOLOGY | Facility: CLINIC | Age: 87
End: 2022-08-04

## 2022-08-04 NOTE — TELEPHONE ENCOUNTER
I spoke to patient regarding overdue labs. She was agreeable to have labs drawn before her appointment. No questions or concerns.

## 2022-08-08 ENCOUNTER — TELEPHONE (OUTPATIENT)
Dept: CARDIOLOGY | Facility: CLINIC | Age: 87
End: 2022-08-08

## 2022-08-08 DIAGNOSIS — R60.0 LOWER EXTREMITY EDEMA: ICD-10-CM

## 2022-08-08 NOTE — TELEPHONE ENCOUNTER
----- Message from WILFRID Mariano sent at 8/8/2022  1:07 PM EDT -----  Blood work reviewed, normal labs. Continue current diuretic regimen and follow up as scheduled.

## 2022-08-16 ENCOUNTER — CLINICAL SUPPORT NO REQUIREMENTS (OUTPATIENT)
Dept: CARDIOLOGY | Facility: CLINIC | Age: 87
End: 2022-08-16

## 2022-08-16 ENCOUNTER — OFFICE VISIT (OUTPATIENT)
Dept: CARDIOLOGY | Facility: CLINIC | Age: 87
End: 2022-08-16

## 2022-08-16 VITALS
DIASTOLIC BLOOD PRESSURE: 78 MMHG | HEART RATE: 71 BPM | HEIGHT: 67 IN | SYSTOLIC BLOOD PRESSURE: 131 MMHG | WEIGHT: 150 LBS | BODY MASS INDEX: 23.54 KG/M2

## 2022-08-16 DIAGNOSIS — I49.5 SSS (SICK SINUS SYNDROME): ICD-10-CM

## 2022-08-16 DIAGNOSIS — I48.19 PERSISTENT ATRIAL FIBRILLATION: ICD-10-CM

## 2022-08-16 DIAGNOSIS — I44.2 AV BLOCK, COMPLETE: ICD-10-CM

## 2022-08-16 DIAGNOSIS — Z95.0 PRESENCE OF CARDIAC PACEMAKER: ICD-10-CM

## 2022-08-16 DIAGNOSIS — Z95.0 PRESENCE OF CARDIAC PACEMAKER: Primary | ICD-10-CM

## 2022-08-16 DIAGNOSIS — Z74.09 IMMOBILITY: ICD-10-CM

## 2022-08-16 DIAGNOSIS — I48.21 PERMANENT ATRIAL FIBRILLATION: ICD-10-CM

## 2022-08-16 DIAGNOSIS — I25.10 CORONARY ARTERY DISEASE INVOLVING NATIVE CORONARY ARTERY OF NATIVE HEART WITHOUT ANGINA PECTORIS: ICD-10-CM

## 2022-08-16 DIAGNOSIS — R60.0 LOWER EXTREMITY EDEMA: Primary | ICD-10-CM

## 2022-08-16 PROCEDURE — 93279 PRGRMG DEV EVAL PM/LDLS PM: CPT | Performed by: INTERNAL MEDICINE

## 2022-08-16 PROCEDURE — 99214 OFFICE O/P EST MOD 30 MIN: CPT | Performed by: INTERNAL MEDICINE

## 2022-08-16 RX ORDER — POTASSIUM CHLORIDE 20 MEQ/1
20 TABLET, EXTENDED RELEASE ORAL DAILY
Qty: 30 TABLET | Refills: 11 | Status: SHIPPED | OUTPATIENT
Start: 2022-08-16

## 2022-08-16 RX ORDER — CARVEDILOL 12.5 MG/1
12.5 TABLET ORAL 2 TIMES DAILY
Qty: 180 TABLET | Refills: 3 | Status: SHIPPED | OUTPATIENT
Start: 2022-08-16

## 2022-08-16 NOTE — PROGRESS NOTES
Chief Complaint  Atrial Fibrillation    Subjective            Renetta Bailey presents to [facility] CARDIOLOGY  History of Present Illness    Ms. Bailey is here for follow-up evaluation management of coronary artery disease, severe multivessel managed medically, permanent atrial fibrillation, AV block with permanent pacemaker, hypertension, chronic lower extremity edema and immobility syndrome.  [place] is clinically stable.  Her lower extremity edema has improved somewhat with adding Zaroxolyn.  She denies chest pain.  No bleeding problems on anticoagulation.    PMH  Past Medical History:   Diagnosis Date   • Atrial fibrillation (HCC)    • CHF (congestive heart failure) (HCC)    • Coronary artery disease    • Hyperlipidemia    • Hypertension    • Myocardial infarction (HCC)          SURGICALHX  Past Surgical History:   Procedure Laterality Date   • CARDIAC CATHETERIZATION Right 2/8/2022    Procedure: Left Heart Cath;  Surgeon: Jeremie Dias MD;  Location: [facility] CATH INVASIVE LOCATION;  Service: Cardiovascular;  Laterality: Right;   • HIP SURGERY     • HYSTERECTOMY     • INSERT / REPLACE / REMOVE PACEMAKER     • TONSILLECTOMY          SOC  Social History     Socioeconomic History   • Marital status:    Tobacco Use   • Smoking status: Never Smoker   • Smokeless tobacco: Never Used   Vaping Use   • Vaping Use: Never used   Substance and Sexual Activity   • Alcohol use: Never   • Drug use: Never   • Sexual activity: Defer         FAMHX  Family History   Problem Relation Age of Onset   • Heart attack Mother    • No Known Problems Father           ALLERGY  Allergies   Allergen Reactions   • Drug [Tramadol Hcl Er] Other (See Comments)     UNAROUSABLE   • Morphine Nausea Only and Other (See Comments)     Pt states it makes her feel weak and sick to her stomach           MEDSCURRENT    Current Outpatient Medications:   •  acetaminophen (TYLENOL) 500 MG tablet, Take 500 mg by mouth Every  "6 (Six) Hours As Needed for Mild Pain ., Disp: , Rfl:   •  cholecalciferol (VITAMIN D3) 25 MCG (1000 UT) tablet, Take 1,000 Units by mouth Daily., Disp: , Rfl:   •  cyanocobalamin (VITAMIN B-12) 1000 MCG tablet, Take 100 mcg by mouth Daily., Disp: , Rfl:   •  furosemide (LASIX) 40 MG tablet, Take 40 mg by mouth 2 (two) times a day., Disp: , Rfl:   •  HYDROcodone-acetaminophen (NORCO) 5-325 MG per tablet, Take 1 tablet by mouth Every 6 (Six) Hours As Needed., Disp: , Rfl:   •  levothyroxine (SYNTHROID, LEVOTHROID) 125 MCG tablet, Take 125 mcg by mouth Daily., Disp: , Rfl:   •  metOLazone (ZAROXOLYN) 2.5 MG tablet, Take 1 tablet by mouth 2 (Two) Times a Week., Disp: 28 tablet, Rfl: 2  •  potassium chloride (K-DUR,KLOR-CON) 20 MEQ CR tablet, Take 20 mEq by mouth Daily., Disp: , Rfl:   •  pravastatin (PRAVACHOL) 40 MG tablet, Take 1 tablet by mouth Daily., Disp: , Rfl:   •  ramipril (ALTACE) 5 MG capsule, Take 10 mg by mouth Daily., Disp: , Rfl:   •  rivaroxaban (XARELTO) 15 MG tablet, Take 1 tablet by mouth Daily., Disp: 30 tablet, Rfl: 3  •  carvedilol (COREG) 12.5 MG tablet, Take 1 tablet by mouth 2 (Two) Times a Day., Disp: 180 tablet, Rfl: 3  •  potassium chloride (K-DUR,KLOR-CON) 20 MEQ CR tablet, Take 1 tablet by mouth Daily., Disp: 30 tablet, Rfl: 11      Review of Systems   Constitutional: Negative for weight gain.   HENT: Positive for hoarse voice.    Cardiovascular: Positive for dyspnea on exertion and leg swelling.   Hematologic/Lymphatic: Does not bruise/bleed easily.        Objective     /78   Pulse 71   Ht 170.2 cm (67\")   Wt 68 kg (150 lb)   BMI 23.49 kg/m²       General Appearance:   · well developed  · well nourished  HENT:   · oropharynx moist  · lips not cyanotic  Neck:  · thyroid not enlarged  · supple  Respiratory:  · no respiratory distress  · normal breath sounds  · no rales  Cardiovascular:  · no jugular venous distention  · regular rhythm  · apical impulse normal  · S1 normal, S2 " normal  · no S3, no S4   · Soft basal systolic murmur  · no rub, no thrill  · carotid pulses normal; no bruit  · pedal pulses normal  · lower extremity edema: 1+, Ace wraps  Musculoskeletal:  · no clubbing of fingers.   · normocephalic, head atraumatic  Skin:   · warm, dry  Psychiatric:  · judgement and insight appropriate  · normal mood and affect      Result Review :             Data reviewed: Pacemaker interrogation reviewed, normal device function, adequate battery life, 51% ventricular pacing, chronic atrial fibrillation.  Recent basic metabolic panel was normal.     Procedures               Assessment and Plan        ASSESSMENT:  Encounter Diagnoses   Name Primary?   • Lower extremity edema Yes   • Immobility    • Coronary artery disease involving native coronary artery of native heart without angina pectoris    • AV block, complete (HCC)    • Permanent atrial fibrillation (HCC)    • Presence of cardiac pacemaker          PLAN:    1.  Lower extremity edema, stable with current diuretic regimen  2.  Immobility syndrome, the patient requires wheelchair to get around, I reordered today due to dysfunction of her current wheelchair, this is a chronic need  3.  Coronary artery disease, multivessel with  of the LAD and RCA with bridging collaterals, no targets for mechanical revascularization.  She is not having angina.  Continue current medical therapy  4.  AV block, presence of cardiac pacemaker, the device is functioning normally, stable, continue remote monitoring  5.  Permanent atrial fibrillation, rate controlled, continue anticoagulation    Follow-up in about 4 months          Patient was given instructions and counseling regarding her condition or for health maintenance advice. Please see specific information pulled into the AVS if appropriate.             CRISTHIAN Best MD  8/16/2022    13:48 EDT

## 2022-08-17 PROBLEM — Z95.0 PRESENCE OF CARDIAC PACEMAKER: Status: ACTIVE | Noted: 2022-08-17

## 2022-08-17 PROBLEM — I49.5 SSS (SICK SINUS SYNDROME): Status: ACTIVE | Noted: 2022-08-17

## 2022-08-17 NOTE — PROGRESS NOTES
Normal VVIR Chamber Pacemaker Device Interrogation and Device Testing.  Normal evaluation of device function and lead measurements.  No optimization was needed of parameters or maximization of device longevity.  Patient is on automated Home Remote Monitoring. Remaining battery is 4 1/2 years.

## 2022-09-09 ENCOUNTER — TELEPHONE (OUTPATIENT)
Dept: CARDIOLOGY | Facility: CLINIC | Age: 87
End: 2022-09-09

## 2022-09-09 NOTE — TELEPHONE ENCOUNTER
Procedure: Excision chest wall mass    Med Directive: Xarelto 2 days prior     PMH: AFIB, CAD, CHF, MI, SSS, HLD, HTN,  pacemaker,       Last Seen: 08/16/2022

## 2022-09-12 NOTE — TELEPHONE ENCOUNTER
Based on the patient history and planned procedure the patient is moderate CV risk for chest wall mass excision    OK to hold xarelto for 2 days before the procedure  Please resume xarelto after the surgery    No additional cardiac testing is needed    Abel Best MD

## 2022-12-19 RX ORDER — METOLAZONE 2.5 MG/1
TABLET ORAL
Qty: 28 TABLET | Refills: 2 | Status: SHIPPED | OUTPATIENT
Start: 2022-12-19

## (undated) DEVICE — CATH F6 ST JL 4 100CM: Brand: SUPERTORQUE

## (undated) DEVICE — CATH F6 ST JR 4 100CM: Brand: SUPERTORQUE

## (undated) DEVICE — GLIDESHEATH SLENDER STAINLESS STEEL KIT: Brand: GLIDESHEATH SLENDER

## (undated) DEVICE — RADIFOCUS OPTITORQUE ANGIOGRAPHIC CATHETER: Brand: OPTITORQUE

## (undated) DEVICE — GW FC FLOP/TP .035 260CM 3MM

## (undated) DEVICE — INTRO SHEATH PRELUDE/PRO .035 5F 11X50CM GRY LF

## (undated) DEVICE — CATH F5INF TLPIGST145 110CM6SH: Brand: INFINITI